# Patient Record
Sex: MALE | Race: WHITE | NOT HISPANIC OR LATINO
[De-identification: names, ages, dates, MRNs, and addresses within clinical notes are randomized per-mention and may not be internally consistent; named-entity substitution may affect disease eponyms.]

---

## 2017-02-15 ENCOUNTER — APPOINTMENT (OUTPATIENT)
Dept: INTERNAL MEDICINE | Facility: CLINIC | Age: 67
End: 2017-02-15
Payer: MEDICARE

## 2017-02-15 DIAGNOSIS — M67.431 GANGLION, RIGHT WRIST: ICD-10-CM

## 2017-02-15 DIAGNOSIS — Z82.3 FAMILY HISTORY OF STROKE: ICD-10-CM

## 2017-02-15 DIAGNOSIS — Z82.62 FAMILY HISTORY OF OSTEOPOROSIS: ICD-10-CM

## 2017-02-15 DIAGNOSIS — Z87.19 PERSONAL HISTORY OF OTHER DISEASES OF THE DIGESTIVE SYSTEM: ICD-10-CM

## 2017-02-15 DIAGNOSIS — M65.4 RADIAL STYLOID TENOSYNOVITIS [DE QUERVAIN]: ICD-10-CM

## 2017-02-15 PROCEDURE — 99214 OFFICE O/P EST MOD 30 MIN: CPT

## 2017-02-15 RX ORDER — METOPROLOL SUCCINATE 25 MG/1
25 TABLET, EXTENDED RELEASE ORAL
Qty: 1 | Refills: 0 | Status: DISCONTINUED | COMMUNITY
Start: 2016-09-19

## 2017-02-15 RX ORDER — ROSUVASTATIN CALCIUM 20 MG/1
20 TABLET, FILM COATED ORAL
Qty: 30 | Refills: 5 | Status: ACTIVE | COMMUNITY
Start: 2017-02-15 | End: 1900-01-01

## 2017-02-15 RX ORDER — CIPROFLOXACIN AND DEXAMETHASONE 3; 1 MG/ML; MG/ML
0.3-0.1 SUSPENSION/ DROPS AURICULAR (OTIC)
Qty: 1 | Refills: 0 | Status: DISCONTINUED | COMMUNITY
Start: 2016-08-26

## 2017-04-13 ENCOUNTER — OUTPATIENT (OUTPATIENT)
Dept: OUTPATIENT SERVICES | Facility: HOSPITAL | Age: 67
LOS: 1 days | Discharge: HOME | End: 2017-04-13
Payer: MEDICARE

## 2017-04-13 ENCOUNTER — APPOINTMENT (OUTPATIENT)
Dept: INTERNAL MEDICINE | Facility: HOSPITAL | Age: 67
End: 2017-04-13
Payer: MEDICARE

## 2017-04-13 DIAGNOSIS — K64.8 OTHER HEMORRHOIDS: ICD-10-CM

## 2017-04-13 DIAGNOSIS — E78.5 HYPERLIPIDEMIA, UNSPECIFIED: ICD-10-CM

## 2017-04-13 DIAGNOSIS — K57.30 DIVERTICULOSIS OF LARGE INTESTINE WITHOUT PERFORATION OR ABSCESS WITHOUT BLEEDING: ICD-10-CM

## 2017-04-13 DIAGNOSIS — Z12.11 ENCOUNTER FOR SCREENING FOR MALIGNANT NEOPLASM OF COLON: ICD-10-CM

## 2017-04-13 PROCEDURE — G0121: CPT

## 2017-04-13 PROCEDURE — 45378 DIAGNOSTIC COLONOSCOPY: CPT

## 2017-08-14 ENCOUNTER — OUTPATIENT (OUTPATIENT)
Dept: OUTPATIENT SERVICES | Facility: HOSPITAL | Age: 67
LOS: 1 days | End: 2017-08-14
Payer: MEDICARE

## 2017-08-14 ENCOUNTER — NON-APPOINTMENT (OUTPATIENT)
Age: 67
End: 2017-08-14

## 2017-08-14 ENCOUNTER — APPOINTMENT (OUTPATIENT)
Dept: RADIOLOGY | Facility: HOSPITAL | Age: 67
End: 2017-08-14
Payer: MEDICARE

## 2017-08-14 ENCOUNTER — APPOINTMENT (OUTPATIENT)
Dept: INTERNAL MEDICINE | Facility: CLINIC | Age: 67
End: 2017-08-14
Payer: MEDICARE

## 2017-08-14 VITALS
SYSTOLIC BLOOD PRESSURE: 126 MMHG | DIASTOLIC BLOOD PRESSURE: 68 MMHG | WEIGHT: 195 LBS | BODY MASS INDEX: 29.55 KG/M2 | HEART RATE: 68 BPM | HEIGHT: 68 IN | RESPIRATION RATE: 14 BRPM

## 2017-08-14 DIAGNOSIS — R05 COUGH: ICD-10-CM

## 2017-08-14 PROCEDURE — 90471 IMMUNIZATION ADMIN: CPT

## 2017-08-14 PROCEDURE — 93000 ELECTROCARDIOGRAM COMPLETE: CPT | Mod: 59

## 2017-08-14 PROCEDURE — 71046 X-RAY EXAM CHEST 2 VIEWS: CPT

## 2017-08-14 PROCEDURE — G0438: CPT

## 2017-08-14 PROCEDURE — 90715 TDAP VACCINE 7 YRS/> IM: CPT | Mod: GY

## 2017-08-14 PROCEDURE — 71020: CPT | Mod: 26

## 2017-08-14 RX ORDER — ROSUVASTATIN CALCIUM 40 MG/1
40 TABLET, FILM COATED ORAL DAILY
Qty: 90 | Refills: 1 | Status: DISCONTINUED | COMMUNITY
End: 2017-08-14

## 2017-08-14 RX ORDER — FLUTICASONE PROPIONATE AND SALMETEROL 50; 100 UG/1; UG/1
100-50 POWDER RESPIRATORY (INHALATION)
Qty: 1 | Refills: 0 | Status: DISCONTINUED | COMMUNITY
Start: 2017-02-15 | End: 2017-08-14

## 2017-08-14 RX ORDER — AZITHROMYCIN 250 MG/1
250 TABLET, FILM COATED ORAL
Qty: 6 | Refills: 1 | Status: DISCONTINUED | COMMUNITY
Start: 2017-02-15 | End: 2017-08-14

## 2017-08-14 RX ORDER — POLYETHYLENE GLYCOL-3350 AND ELECTROLYTES 236; 6.74; 5.86; 2.97; 22.74 G/274.31G; G/274.31G; G/274.31G; G/274.31G; G/274.31G
236 POWDER, FOR SOLUTION ORAL
Qty: 1 | Refills: 0 | Status: DISCONTINUED | COMMUNITY
Start: 2017-02-15 | End: 2017-08-14

## 2017-08-28 ENCOUNTER — LABORATORY RESULT (OUTPATIENT)
Age: 67
End: 2017-08-28

## 2017-09-01 LAB
25(OH)D3 SERPL-MCNC: 27.5 NG/ML
ALBUMIN SERPL ELPH-MCNC: 4.3 G/DL
ALP BLD-CCNC: 62 U/L
ALT SERPL-CCNC: 35 U/L
ANION GAP SERPL CALC-SCNC: 13 MMOL/L
APPEARANCE: CLEAR
AST SERPL-CCNC: 32 U/L
BASOPHILS # BLD AUTO: 0.05 K/UL
BASOPHILS NFR BLD AUTO: 0.8 %
BILIRUB SERPL-MCNC: 1.4 MG/DL
BILIRUBIN URINE: NEGATIVE
BLOOD URINE: NEGATIVE
BUN SERPL-MCNC: 19 MG/DL
CALCIUM SERPL-MCNC: 9.7 MG/DL
CHLORIDE SERPL-SCNC: 104 MMOL/L
CHOLEST SERPL-MCNC: 219 MG/DL
CHOLEST/HDLC SERPL: 3.8 RATIO
CO2 SERPL-SCNC: 25 MMOL/L
COLOR: YELLOW
CREAT SERPL-MCNC: 1.06 MG/DL
CRP SERPL HS-MCNC: 0.8 MG/L
EOSINOPHIL # BLD AUTO: 0.15 K/UL
EOSINOPHIL NFR BLD AUTO: 2.5 %
GLUCOSE BS SERPL-MCNC: 96 MG/DL
GLUCOSE QUALITATIVE U: NORMAL MG/DL
GLUCOSE SERPL-MCNC: 102 MG/DL
HBA1C MFR BLD HPLC: 5.9 %
HCT VFR BLD CALC: 50.4 %
HDLC SERPL-MCNC: 57 MG/DL
HGB BLD-MCNC: 16.8 G/DL
IMM GRANULOCYTES NFR BLD AUTO: 0 %
KETONES URINE: NEGATIVE
LDLC SERPL CALC-MCNC: 129 MG/DL
LEUKOCYTE ESTERASE URINE: NEGATIVE
LYMPHOCYTES # BLD AUTO: 1.32 K/UL
LYMPHOCYTES NFR BLD AUTO: 22.4 %
MAN DIFF?: NORMAL
MCHC RBC-ENTMCNC: 29.3 PG
MCHC RBC-ENTMCNC: 33.3 GM/DL
MCV RBC AUTO: 88 FL
MONOCYTES # BLD AUTO: 0.48 K/UL
MONOCYTES NFR BLD AUTO: 8.1 %
NEUTROPHILS # BLD AUTO: 3.89 K/UL
NEUTROPHILS NFR BLD AUTO: 66.2 %
NITRITE URINE: NEGATIVE
PH URINE: 6
PLATELET # BLD AUTO: 163 K/UL
POTASSIUM SERPL-SCNC: 4.7 MMOL/L
PROT SERPL-MCNC: 7.4 G/DL
PROTEIN URINE: 30 MG/DL
PSA FREE FLD-MCNC: 48.7
PSA FREE SERPL-MCNC: 0.19 NG/ML
PSA SERPL-MCNC: 0.39 NG/ML
RBC # BLD: 5.73 M/UL
RBC # FLD: 13.4 %
SODIUM SERPL-SCNC: 142 MMOL/L
SPECIFIC GRAVITY URINE: 1.03
T4 FREE SERPL-MCNC: 1.2 NG/DL
TRIGL SERPL-MCNC: 163 MG/DL
TSH SERPL-ACNC: 2.17 UIU/ML
UROBILINOGEN URINE: NORMAL MG/DL
VIT B12 SERPL-MCNC: 299 PG/ML
WBC # FLD AUTO: 5.89 K/UL

## 2017-10-17 ENCOUNTER — APPOINTMENT (OUTPATIENT)
Dept: INTERNAL MEDICINE | Facility: CLINIC | Age: 67
End: 2017-10-17
Payer: MEDICARE

## 2017-10-17 VITALS
WEIGHT: 196 LBS | BODY MASS INDEX: 29.7 KG/M2 | TEMPERATURE: 98.2 F | HEIGHT: 68 IN | SYSTOLIC BLOOD PRESSURE: 128 MMHG | RESPIRATION RATE: 14 BRPM | HEART RATE: 72 BPM | DIASTOLIC BLOOD PRESSURE: 72 MMHG

## 2017-10-17 PROCEDURE — 99214 OFFICE O/P EST MOD 30 MIN: CPT

## 2018-05-07 ENCOUNTER — TRANSCRIPTION ENCOUNTER (OUTPATIENT)
Age: 68
End: 2018-05-07

## 2018-07-02 ENCOUNTER — APPOINTMENT (OUTPATIENT)
Dept: INTERNAL MEDICINE | Facility: CLINIC | Age: 68
End: 2018-07-02

## 2018-07-17 ENCOUNTER — TRANSCRIPTION ENCOUNTER (OUTPATIENT)
Age: 68
End: 2018-07-17

## 2018-10-26 ENCOUNTER — APPOINTMENT (OUTPATIENT)
Dept: INTERNAL MEDICINE | Facility: CLINIC | Age: 68
End: 2018-10-26

## 2018-11-06 ENCOUNTER — APPOINTMENT (OUTPATIENT)
Dept: INTERNAL MEDICINE | Facility: CLINIC | Age: 68
End: 2018-11-06
Payer: MEDICARE

## 2018-11-06 ENCOUNTER — NON-APPOINTMENT (OUTPATIENT)
Age: 68
End: 2018-11-06

## 2018-11-06 VITALS — RESPIRATION RATE: 16 BRPM | HEART RATE: 74 BPM

## 2018-11-06 VITALS — HEIGHT: 68 IN | BODY MASS INDEX: 30.01 KG/M2 | WEIGHT: 198 LBS

## 2018-11-06 VITALS — TEMPERATURE: 98.6 F | SYSTOLIC BLOOD PRESSURE: 130 MMHG | DIASTOLIC BLOOD PRESSURE: 80 MMHG

## 2018-11-06 DIAGNOSIS — R00.2 PALPITATIONS: ICD-10-CM

## 2018-11-06 DIAGNOSIS — Z78.9 OTHER SPECIFIED HEALTH STATUS: ICD-10-CM

## 2018-11-06 PROCEDURE — G0439: CPT | Mod: 26

## 2018-11-06 PROCEDURE — 99213 OFFICE O/P EST LOW 20 MIN: CPT | Mod: 25

## 2018-11-06 PROCEDURE — 93000 ELECTROCARDIOGRAM COMPLETE: CPT

## 2018-11-06 NOTE — PHYSICAL EXAM
[No Acute Distress] : no acute distress [Well Nourished] : well nourished [Well Developed] : well developed [Well-Appearing] : well-appearing [Normal Voice/Communication] : normal voice/communication [Normal Sclera/Conjunctiva] : normal sclera/conjunctiva [PERRL] : pupils equal round and reactive to light [EOMI] : extraocular movements intact [Normal Outer Ear/Nose] : the outer ears and nose were normal in appearance [Normal Oropharynx] : the oropharynx was normal [Normal TMs] : both tympanic membranes were normal [Normal Nasal Mucosa] : the nasal mucosa was normal [No JVD] : no jugular venous distention [Supple] : supple [No Lymphadenopathy] : no lymphadenopathy [Thyroid Normal, No Nodules] : the thyroid was normal and there were no nodules present [No Respiratory Distress] : no respiratory distress  [Clear to Auscultation] : lungs were clear to auscultation bilaterally [No Accessory Muscle Use] : no accessory muscle use [Normal Percussion] : the chest was normal to percussion [Normal Rate] : normal rate  [Regular Rhythm] : with a regular rhythm [Normal S1, S2] : normal S1 and S2 [No Murmur] : no murmur heard [No Carotid Bruits] : no carotid bruits [No Abdominal Bruit] : a ~M bruit was not heard ~T in the abdomen [No Varicosities] : no varicosities [Pedal Pulses Present] : the pedal pulses are present [No Edema] : there was no peripheral edema [No Extremity Clubbing/Cyanosis] : no extremity clubbing/cyanosis [No Palpable Aorta] : no palpable aorta [Declined Breast Exam] : declined breast exam  [Soft] : abdomen soft [Non Tender] : non-tender [Non-distended] : non-distended [No Masses] : no abdominal mass palpated [No HSM] : no HSM [Normal Bowel Sounds] : normal bowel sounds [No Hernias] : no hernias [Declined Rectal Exam] : declined rectal exam [Normal Supraclavicular Nodes] : no supraclavicular lymphadenopathy [Normal Axillary Nodes] : no axillary lymphadenopathy [Normal Posterior Cervical Nodes] : no posterior cervical lymphadenopathy [Normal Femoral Nodes] : no femoral lymphadenopathy [Normal Anterior Cervical Nodes] : no anterior cervical lymphadenopathy [Normal Inguinal Nodes] : no inguinal lymphadenopathy [No CVA Tenderness] : no CVA  tenderness [No Spinal Tenderness] : no spinal tenderness [Grossly Normal Strength/Tone] : grossly normal strength/tone [No Rash] : no rash [No Skin Lesions] : no skin lesions [Normal Gait] : normal gait [Coordination Grossly Intact] : coordination grossly intact [No Focal Deficits] : no focal deficits [Deep Tendon Reflexes (DTR)] : deep tendon reflexes were 2+ and symmetric [Speech Grossly Normal] : speech grossly normal [Memory Grossly Normal] : memory grossly normal [Normal Affect] : the affect was normal [Alert and Oriented x3] : oriented to person, place, and time [Normal Mood] : the mood was normal [Normal Insight/Judgement] : insight and judgment were intact [Kyphosis] : no kyphosis [Scoliosis] : no scoliosis [Acne] : no acne [de-identified] : base bif toe on right swollen and tender  mild tenderness little toe

## 2018-11-06 NOTE — HEALTH RISK ASSESSMENT
[No falls in past year] : Patient reported no falls in the past year [0] : 2) Feeling down, depressed, or hopeless: Not at all (0) [[id number]] : 0

## 2018-11-06 NOTE — REVIEW OF SYSTEMS
[Fatigue] : fatigue [Nocturia] : nocturia [Joint Pain] : joint pain [Joint Swelling] : joint swelling [Insomnia] : insomnia [Anxiety] : anxiety [Fever] : no fever [Chills] : no chills [Hot Flashes] : no hot flashes [Night Sweats] : no night sweats [Recent Change In Weight] : ~T no recent weight change [Discharge] : no discharge [Pain] : no pain [Redness] : no redness [Dryness] : no dryness [Vision Problems] : no vision problems [Itching] : no itching [Earache] : no earache [Hearing Loss] : no hearing loss [Nosebleeds] : no nosebleeds [Postnasal Drip] : no postnasal drip [Nasal Discharge] : no nasal discharge [Sore Throat] : no sore throat [Hoarseness] : no hoarseness [Chest Pain] : no chest pain [Palpitations] : no palpitations [Claudication] : no  leg claudication [Lower Ext Edema] : no lower extremity edema [Orthopena] : no orthopnea [Paroysmal Nocturnal Dyspnea] : no paroysmal nocturnal dyspnea [Shortness Of Breath] : no shortness of breath [Wheezing] : no wheezing [Cough] : no cough [Dyspnea on Exertion] : not dyspnea on exertion [Abdominal Pain] : no abdominal pain [Nausea] : no nausea [Constipation] : no constipation [Diarrhea] : no diarrhea [Vomiting] : no vomiting [Heartburn] : no heartburn [Melena] : no melena [Dysuria] : no dysuria [Incontinence] : no incontinence [Hesitancy] : no hesitancy [Hematuria] : no hematuria [Frequency] : no frequency [Impotence] : no impotency [Poor Libido] : libido not poor [Joint Stiffness] : no joint stiffness [Muscle Pain] : no muscle pain [Muscle Weakness] : no muscle weakness [Back Pain] : no back pain [Itching] : no itching [Mole Changes] : no mole changes [Nail Changes] : no nail changes [Hair Changes] : no hair changes [Skin Rash] : no skin rash [Headache] : no headache [Dizziness] : no dizziness [Fainting] : no fainting [Confusion] : no confusion [Unsteady Walk] : no ataxia [Memory Loss] : no memory loss [Suicidal] : not suicidal [Depression] : no depression [Easy Bleeding] : no easy bleeding [Easy Bruising] : no easy bruising [Swollen Glands] : no swollen glands [FreeTextEntry9] : right ft base big toe ans little toe

## 2018-11-06 NOTE — ASSESSMENT
[FreeTextEntry1] : Physical exam shows a well-developed man in no acute distress blood pressure was 130/80 height 5 foot 8 weight 198 pounds BMI 30.11 heart rate is 74 respirations at 16 HEENT was unremarkable chest was clear cardiovascular exam was regular abdomen soft neuro nonfocal EKG showed a right bundle branch block which has been present prior slip was given for full blood tests tapering schedule of indomethacin was given for the gout that he has he will reported daily uric acid was included in his blood tests . He also has felt somewhat fatigued and a testosterone level will be drawn as well is under a lot of stress selling his house with ideas of relocating and retiring he was offered medication such as an antidepressant and refuses it at this time he is up-to-date with his ophthalmologist and dermatologist we will check the status of his last colonoscopy in contact refuses immunizations at this time

## 2018-11-06 NOTE — PHYSICAL EXAM
[No Acute Distress] : no acute distress [Well Nourished] : well nourished [Well Developed] : well developed [Well-Appearing] : well-appearing [Normal Voice/Communication] : normal voice/communication [Normal Sclera/Conjunctiva] : normal sclera/conjunctiva [PERRL] : pupils equal round and reactive to light [EOMI] : extraocular movements intact [Normal Outer Ear/Nose] : the outer ears and nose were normal in appearance [Normal Oropharynx] : the oropharynx was normal [Normal TMs] : both tympanic membranes were normal [Normal Nasal Mucosa] : the nasal mucosa was normal [No JVD] : no jugular venous distention [Supple] : supple [No Lymphadenopathy] : no lymphadenopathy [Thyroid Normal, No Nodules] : the thyroid was normal and there were no nodules present [No Respiratory Distress] : no respiratory distress  [Clear to Auscultation] : lungs were clear to auscultation bilaterally [No Accessory Muscle Use] : no accessory muscle use [Normal Percussion] : the chest was normal to percussion [Normal Rate] : normal rate  [Regular Rhythm] : with a regular rhythm [Normal S1, S2] : normal S1 and S2 [No Murmur] : no murmur heard [No Carotid Bruits] : no carotid bruits [No Abdominal Bruit] : a ~M bruit was not heard ~T in the abdomen [No Varicosities] : no varicosities [Pedal Pulses Present] : the pedal pulses are present [No Edema] : there was no peripheral edema [No Extremity Clubbing/Cyanosis] : no extremity clubbing/cyanosis [No Palpable Aorta] : no palpable aorta [Declined Breast Exam] : declined breast exam  [Soft] : abdomen soft [Non Tender] : non-tender [Non-distended] : non-distended [No Masses] : no abdominal mass palpated [No HSM] : no HSM [Normal Bowel Sounds] : normal bowel sounds [No Hernias] : no hernias [Declined Rectal Exam] : declined rectal exam [Normal Supraclavicular Nodes] : no supraclavicular lymphadenopathy [Normal Axillary Nodes] : no axillary lymphadenopathy [Normal Posterior Cervical Nodes] : no posterior cervical lymphadenopathy [Normal Femoral Nodes] : no femoral lymphadenopathy [Normal Anterior Cervical Nodes] : no anterior cervical lymphadenopathy [Normal Inguinal Nodes] : no inguinal lymphadenopathy [No CVA Tenderness] : no CVA  tenderness [No Spinal Tenderness] : no spinal tenderness [Grossly Normal Strength/Tone] : grossly normal strength/tone [No Rash] : no rash [No Skin Lesions] : no skin lesions [Normal Gait] : normal gait [Coordination Grossly Intact] : coordination grossly intact [No Focal Deficits] : no focal deficits [Deep Tendon Reflexes (DTR)] : deep tendon reflexes were 2+ and symmetric [Speech Grossly Normal] : speech grossly normal [Memory Grossly Normal] : memory grossly normal [Normal Affect] : the affect was normal [Alert and Oriented x3] : oriented to person, place, and time [Normal Mood] : the mood was normal [Normal Insight/Judgement] : insight and judgment were intact [Kyphosis] : no kyphosis [Scoliosis] : no scoliosis [Acne] : no acne [de-identified] : base bif toe on right swollen and tender  mild tenderness little toe

## 2018-11-06 NOTE — HISTORY OF PRESENT ILLNESS
[FreeTextEntry1] : Comes to the office for a comprehensive physical examination to review his medications and discuss his overall health recent complaints dealing with soreness at the base of his big toe of his right foot [de-identified] : 68-year-old man comes to the office after a hiatus of several years with a history of hyperlipidemia patient has been experiencing pain at the base of his right big toe over the past several weeks so her podiatrist who treated him for fungus patient unable to walk or touch the area secondary to pain he has a history of gout in the past also complaining of some mild discomfort in the right foot little toe he denies temperature chills sweats or myalgias . He has had no headache sinus congestion sore throat cough chest pain pleurisy shortness of breath exertional dyspnea lightheadedness dizziness vertigo or syncope denies abdominal pain dysuria hematuria nausea vomiting diarrhea constipation bright red blood per rectum or black stools his appetite has been good and his weight has been stable

## 2018-11-06 NOTE — HISTORY OF PRESENT ILLNESS
[FreeTextEntry1] : Comes to the office for a comprehensive physical examination to review his medications and discuss his overall health recent complaints dealing with soreness at the base of his big toe of his right foot [de-identified] : 68-year-old man comes to the office after a hiatus of several years with a history of hyperlipidemia patient has been experiencing pain at the base of his right big toe over the past several weeks so her podiatrist who treated him for fungus patient unable to walk or touch the area secondary to pain he has a history of gout in the past also complaining of some mild discomfort in the right foot little toe he denies temperature chills sweats or myalgias . He has had no headache sinus congestion sore throat cough chest pain pleurisy shortness of breath exertional dyspnea lightheadedness dizziness vertigo or syncope denies abdominal pain dysuria hematuria nausea vomiting diarrhea constipation bright red blood per rectum or black stools his appetite has been good and his weight has been stable

## 2018-11-06 NOTE — HEALTH RISK ASSESSMENT
[No falls in past year] : Patient reported no falls in the past year [0] : 2) Feeling down, depressed, or hopeless: Not at all (0) [[id number]] : 0

## 2018-12-11 ENCOUNTER — LABORATORY RESULT (OUTPATIENT)
Age: 68
End: 2018-12-11

## 2018-12-20 ENCOUNTER — FORM ENCOUNTER (OUTPATIENT)
Age: 68
End: 2018-12-20

## 2018-12-21 ENCOUNTER — APPOINTMENT (OUTPATIENT)
Dept: RADIOLOGY | Facility: HOSPITAL | Age: 68
End: 2018-12-21
Payer: MEDICARE

## 2018-12-21 ENCOUNTER — OUTPATIENT (OUTPATIENT)
Dept: OUTPATIENT SERVICES | Facility: HOSPITAL | Age: 68
LOS: 1 days | End: 2018-12-21
Payer: MEDICARE

## 2018-12-21 DIAGNOSIS — Z00.8 ENCOUNTER FOR OTHER GENERAL EXAMINATION: ICD-10-CM

## 2018-12-21 PROCEDURE — 71046 X-RAY EXAM CHEST 2 VIEWS: CPT

## 2018-12-21 PROCEDURE — 71046 X-RAY EXAM CHEST 2 VIEWS: CPT | Mod: 26

## 2019-01-09 LAB
25(OH)D3 SERPL-MCNC: 29.3 NG/ML
ALBUMIN SERPL ELPH-MCNC: 4.5 G/DL
ALP BLD-CCNC: 64 U/L
ALT SERPL-CCNC: 33 U/L
ANION GAP SERPL CALC-SCNC: 13 MMOL/L
APPEARANCE: CLEAR
AST SERPL-CCNC: 30 U/L
BASOPHILS # BLD AUTO: 0.09 K/UL
BASOPHILS NFR BLD AUTO: 1.5 %
BILIRUB SERPL-MCNC: 1 MG/DL
BILIRUBIN URINE: NEGATIVE
BLOOD URINE: NEGATIVE
BUN SERPL-MCNC: 22 MG/DL
CALCIUM SERPL-MCNC: 10.1 MG/DL
CHLORIDE SERPL-SCNC: 104 MMOL/L
CHOLEST SERPL-MCNC: 213 MG/DL
CHOLEST/HDLC SERPL: 3.4 RATIO
CO2 SERPL-SCNC: 26 MMOL/L
COLOR: YELLOW
CREAT SERPL-MCNC: 1.13 MG/DL
CRP SERPL HS-MCNC: 4.1 MG/L
EOSINOPHIL # BLD AUTO: 0.19 K/UL
EOSINOPHIL NFR BLD AUTO: 3.1 %
GLUCOSE BS SERPL-MCNC: 102 MG/DL
GLUCOSE QUALITATIVE U: NEGATIVE MG/DL
GLUCOSE SERPL-MCNC: 103 MG/DL
HBA1C MFR BLD HPLC: 5.9 %
HCT VFR BLD CALC: 50.7 %
HDLC SERPL-MCNC: 63 MG/DL
HGB BLD-MCNC: 16.6 G/DL
IMM GRANULOCYTES NFR BLD AUTO: 0.3 %
KETONES URINE: NEGATIVE
LDLC SERPL CALC-MCNC: 131 MG/DL
LEUKOCYTE ESTERASE URINE: NEGATIVE
LYMPHOCYTES # BLD AUTO: 1.6 K/UL
LYMPHOCYTES NFR BLD AUTO: 26.4 %
MAN DIFF?: NORMAL
MCHC RBC-ENTMCNC: 29 PG
MCHC RBC-ENTMCNC: 32.7 GM/DL
MCV RBC AUTO: 88.5 FL
MONOCYTES # BLD AUTO: 0.59 K/UL
MONOCYTES NFR BLD AUTO: 9.7 %
NEUTROPHILS # BLD AUTO: 3.58 K/UL
NEUTROPHILS NFR BLD AUTO: 59 %
NITRITE URINE: NEGATIVE
PH URINE: 5.5
PLATELET # BLD AUTO: 178 K/UL
POTASSIUM SERPL-SCNC: 5.4 MMOL/L
PROT SERPL-MCNC: 7.7 G/DL
PROTEIN URINE: ABNORMAL MG/DL
PSA FREE FLD-MCNC: 40 %
PSA FREE SERPL-MCNC: 0.19 NG/ML
PSA SERPL-MCNC: 0.48 NG/ML
RBC # BLD: 5.73 M/UL
RBC # FLD: 13.4 %
SODIUM SERPL-SCNC: 143 MMOL/L
SPECIFIC GRAVITY URINE: 1.02
T4 FREE SERPL-MCNC: 1.2 NG/DL
TESTOST BND SERPL-MCNC: 10.9 PG/ML
TESTOST SERPL-MCNC: 434.2 NG/DL
TRIGL SERPL-MCNC: 97 MG/DL
TSH SERPL-ACNC: 4.19 UIU/ML
URATE SERPL-MCNC: 7.2 MG/DL
UROBILINOGEN URINE: NEGATIVE MG/DL
VIT B12 SERPL-MCNC: 385 PG/ML
WBC # FLD AUTO: 6.07 K/UL

## 2019-06-20 ENCOUNTER — RX RENEWAL (OUTPATIENT)
Age: 69
End: 2019-06-20

## 2019-08-28 ENCOUNTER — INPATIENT (INPATIENT)
Facility: HOSPITAL | Age: 69
LOS: 1 days | Discharge: HOME | DRG: 310 | End: 2019-08-30
Attending: INTERNAL MEDICINE | Admitting: INTERNAL MEDICINE
Payer: MEDICARE

## 2019-08-28 VITALS
HEIGHT: 68 IN | OXYGEN SATURATION: 100 % | WEIGHT: 199.96 LBS | HEART RATE: 48 BPM | DIASTOLIC BLOOD PRESSURE: 76 MMHG | TEMPERATURE: 97 F | SYSTOLIC BLOOD PRESSURE: 153 MMHG | RESPIRATION RATE: 17 BRPM

## 2019-08-28 DIAGNOSIS — R00.1 BRADYCARDIA, UNSPECIFIED: ICD-10-CM

## 2019-08-28 LAB
ALBUMIN SERPL ELPH-MCNC: 3.5 G/DL — SIGNIFICANT CHANGE UP (ref 3.3–5)
ALP SERPL-CCNC: 63 U/L — SIGNIFICANT CHANGE UP (ref 40–120)
ALT FLD-CCNC: 42 U/L DA — SIGNIFICANT CHANGE UP (ref 10–45)
ANION GAP SERPL CALC-SCNC: 6 MMOL/L — SIGNIFICANT CHANGE UP (ref 5–17)
AST SERPL-CCNC: 38 U/L — SIGNIFICANT CHANGE UP (ref 10–40)
BILIRUB SERPL-MCNC: 0.8 MG/DL — SIGNIFICANT CHANGE UP (ref 0.2–1.2)
BUN SERPL-MCNC: 26 MG/DL — HIGH (ref 7–23)
CALCIUM SERPL-MCNC: 9.6 MG/DL — SIGNIFICANT CHANGE UP (ref 8.4–10.5)
CHLORIDE SERPL-SCNC: 105 MMOL/L — SIGNIFICANT CHANGE UP (ref 96–108)
CO2 SERPL-SCNC: 30 MMOL/L — SIGNIFICANT CHANGE UP (ref 22–31)
CREAT SERPL-MCNC: 1.18 MG/DL — SIGNIFICANT CHANGE UP (ref 0.5–1.3)
GLUCOSE SERPL-MCNC: 105 MG/DL — HIGH (ref 70–99)
HCT VFR BLD CALC: 52.2 % — HIGH (ref 39–50)
HGB BLD-MCNC: 17 G/DL — SIGNIFICANT CHANGE UP (ref 13–17)
MCHC RBC-ENTMCNC: 28.9 PG — SIGNIFICANT CHANGE UP (ref 27–34)
MCHC RBC-ENTMCNC: 32.6 GM/DL — SIGNIFICANT CHANGE UP (ref 32–36)
MCV RBC AUTO: 88.8 FL — SIGNIFICANT CHANGE UP (ref 80–100)
NRBC # BLD: 0 /100 WBCS — SIGNIFICANT CHANGE UP (ref 0–0)
PLATELET # BLD AUTO: 168 K/UL — SIGNIFICANT CHANGE UP (ref 150–400)
POTASSIUM SERPL-MCNC: 4.3 MMOL/L — SIGNIFICANT CHANGE UP (ref 3.5–5.3)
POTASSIUM SERPL-SCNC: 4.3 MMOL/L — SIGNIFICANT CHANGE UP (ref 3.5–5.3)
PROT SERPL-MCNC: 7.7 G/DL — SIGNIFICANT CHANGE UP (ref 6–8.3)
RBC # BLD: 5.88 M/UL — HIGH (ref 4.2–5.8)
RBC # FLD: 13 % — SIGNIFICANT CHANGE UP (ref 10.3–14.5)
SODIUM SERPL-SCNC: 141 MMOL/L — SIGNIFICANT CHANGE UP (ref 135–145)
TROPONIN I SERPL-MCNC: <.017 NG/ML — LOW (ref 0.02–0.06)
WBC # BLD: 7.19 K/UL — SIGNIFICANT CHANGE UP (ref 3.8–10.5)
WBC # FLD AUTO: 7.19 K/UL — SIGNIFICANT CHANGE UP (ref 3.8–10.5)

## 2019-08-28 PROCEDURE — 99223 1ST HOSP IP/OBS HIGH 75: CPT

## 2019-08-28 PROCEDURE — 93010 ELECTROCARDIOGRAM REPORT: CPT

## 2019-08-28 PROCEDURE — 99285 EMERGENCY DEPT VISIT HI MDM: CPT

## 2019-08-28 PROCEDURE — 71045 X-RAY EXAM CHEST 1 VIEW: CPT | Mod: 26

## 2019-08-28 RX ORDER — ATORVASTATIN CALCIUM 80 MG/1
40 TABLET, FILM COATED ORAL AT BEDTIME
Refills: 0 | Status: DISCONTINUED | OUTPATIENT
Start: 2019-08-28 | End: 2019-08-30

## 2019-08-28 RX ORDER — ONDANSETRON 8 MG/1
4 TABLET, FILM COATED ORAL EVERY 6 HOURS
Refills: 0 | Status: DISCONTINUED | OUTPATIENT
Start: 2019-08-28 | End: 2019-08-30

## 2019-08-28 RX ORDER — MECLIZINE HCL 12.5 MG
12.5 TABLET ORAL
Refills: 0 | Status: DISCONTINUED | OUTPATIENT
Start: 2019-08-28 | End: 2019-08-30

## 2019-08-28 RX ORDER — ASPIRIN/CALCIUM CARB/MAGNESIUM 324 MG
81 TABLET ORAL DAILY
Refills: 0 | Status: DISCONTINUED | OUTPATIENT
Start: 2019-08-29 | End: 2019-08-30

## 2019-08-28 RX ORDER — PANTOPRAZOLE SODIUM 20 MG/1
40 TABLET, DELAYED RELEASE ORAL
Refills: 0 | Status: DISCONTINUED | OUTPATIENT
Start: 2019-08-28 | End: 2019-08-30

## 2019-08-28 RX ORDER — ACETAMINOPHEN 500 MG
650 TABLET ORAL EVERY 6 HOURS
Refills: 0 | Status: DISCONTINUED | OUTPATIENT
Start: 2019-08-28 | End: 2019-08-30

## 2019-08-28 RX ORDER — SODIUM CHLORIDE 9 MG/ML
500 INJECTION INTRAMUSCULAR; INTRAVENOUS; SUBCUTANEOUS ONCE
Refills: 0 | Status: COMPLETED | OUTPATIENT
Start: 2019-08-28 | End: 2019-08-28

## 2019-08-28 RX ORDER — ATROPINE SULFATE 0.1 MG/ML
0.5 SYRINGE (ML) INJECTION ONCE
Refills: 0 | Status: COMPLETED | OUTPATIENT
Start: 2019-08-28 | End: 2019-08-28

## 2019-08-28 RX ADMIN — SODIUM CHLORIDE 500 MILLILITER(S): 9 INJECTION INTRAMUSCULAR; INTRAVENOUS; SUBCUTANEOUS at 20:53

## 2019-08-28 RX ADMIN — Medication 0.5 MILLIGRAM(S): at 20:47

## 2019-08-28 RX ADMIN — SODIUM CHLORIDE 500 MILLILITER(S): 9 INJECTION INTRAMUSCULAR; INTRAVENOUS; SUBCUTANEOUS at 21:53

## 2019-08-28 RX ADMIN — ATORVASTATIN CALCIUM 40 MILLIGRAM(S): 80 TABLET, FILM COATED ORAL at 23:52

## 2019-08-28 NOTE — ED ADULT NURSE NOTE - CHIEF COMPLAINT QUOTE
Patient BIB EMS, states he was at home sitting at his computer when he was overcome with nausea, weakness and diaphoresis. Patient noted to be bradycardic (40s), received 1/2 mg atropine and 4 mg Zofran en route.

## 2019-08-28 NOTE — H&P ADULT - NEUROLOGICAL DETAILS
alert and oriented x 3/responds to pain/responds to verbal commands/sensation intact/deep reflexes intact/cranial nerves intact

## 2019-08-28 NOTE — ED ADULT NURSE NOTE - NSIMPLEMENTINTERV_GEN_ALL_ED
Implemented All Fall Risk Interventions:  [place] to call system. Call bell, personal items and telephone within reach. Instruct patient to call for assistance. Room bathroom lighting operational. Non-slip footwear when patient is off stretcher. Physically safe environment: no spills, clutter or unnecessary equipment. Stretcher in lowest position, wheels locked, appropriate side rails in place. Provide visual cue, wrist band, yellow gown, etc. Monitor gait and stability. Monitor for mental status changes and reorient to person, place, and time. Review medications for side effects contributing to fall risk. Reinforce activity limits and safety measures with patient and family.

## 2019-08-28 NOTE — ED PROVIDER NOTE - ATTENDING CONTRIBUTION TO CARE
pt c/o acute onset dizziness, lightheaded,, some room spinning,  severe, felt like fainting about 1830 tonight while working at computer, assoc c sweats and nausea. no chest pain, neck/jaw/arm pain.  pt noted few seconds of chest tightness 2 hrs before that.  per EMS , pt HR in 40s with SBP 160s, was given atropine 0.5 mg with no effect on sxs or HR.   pt had similar episode sept 2016, but it was related to consuming edible THC.  pt states he is still feelingmildly dizzy but better   PMH: RBBB, hypercholesterolemia    exam:   General: well appearing, NAD.   HEENT: eyes perrl, nose normal, OP no erythema/exudate/swelling.   cor: bradycardic, reg rhythm, s1s2, 2+rad pulses.   lungs: ctabl, no resp distress.   abd: soft, ntnd.   neuro: a&ox3, cn2-12 intact, MARTEL, 5/5 strength c nl sensation all extremities, nl coordination.   MSK: no extremity swelling.  Skin: normal, no rash      AP: dizziness, sweats, with bradycardia.  possible symptomatic bradycardia, acs, vertigo.  check labs, trops.  give additional trial of atropine . admit

## 2019-08-28 NOTE — H&P ADULT - ASSESSMENT
69 year old male, with HLD, presents with symptomatic bradycardia - +dizziness, nausea, chest tightness, near syncope.  ?arrythmia R/o ACS  ?Hypertension    Admit to telemetry  Trend trops, echo, carotid dopplers  Cont Statin, start ASA  Cont to monitor BP  Cardio eval - Dr Caro  Antiemetic prn, Meclizine prn  GI prophylaxis    IMPROVE VTE Individual Risk Assessment  RISK                                                                Points  [  ] Previous VTE                                                  3  [  ] Thrombophilia                                               2  [  ] Lower limb paralysis                                      2        (unable to hold up >15 seconds)    [  ] Current Cancer                                              2         (within 6 months)  [  ] Immobilization > 24 hrs                                1  [  ] ICU/CCU stay > 24 hours                              1  [x  ] Age > 60                                                      1  IMPROVE VTE Score __1__  IMPROVE Score 0-1: Low Risk, No VTE prophylaxis required for most patients, encourage ambulation.   IMPROVE Score 2-3: At risk, pharmacologic VTE prophylaxis is indicated for most patients (in the absence of a contraindication)  IMPROVE Score > or = 4: High Risk, pharmacologic VTE prophylaxis is indicated for most patients (in the absence of a contraindication) 69 year old male, with HLD, presents with symptomatic bradycardia - +dizziness, nausea, chest tightness, near syncope.  ?asovagal ?arrythmia R/o ACS    Admit to telemetry  Trend trops, echo, carotid dopplers  Cont Statin, start ASA  Cont to monitor BP  Cardio eval - Dr Caro  Antiemetic prn, Meclizine prn  GI prophylaxis    IMPROVE VTE Individual Risk Assessment  RISK                                                                Points  [  ] Previous VTE                                                  3  [  ] Thrombophilia                                               2  [  ] Lower limb paralysis                                      2        (unable to hold up >15 seconds)    [  ] Current Cancer                                              2         (within 6 months)  [  ] Immobilization > 24 hrs                                1  [  ] ICU/CCU stay > 24 hours                              1  [x  ] Age > 60                                                      1  IMPROVE VTE Score __1__  IMPROVE Score 0-1: Low Risk, No VTE prophylaxis required for most patients, encourage ambulation.   IMPROVE Score 2-3: At risk, pharmacologic VTE prophylaxis is indicated for most patients (in the absence of a contraindication)  IMPROVE Score > or = 4: High Risk, pharmacologic VTE prophylaxis is indicated for most patients (in the absence of a contraindication)

## 2019-08-28 NOTE — H&P ADULT - GASTROINTESTINAL DETAILS
soft/nontender/no distention/no masses palpable/bowel sounds normal/no bruit/no rebound tenderness/no guarding

## 2019-08-28 NOTE — ED PROVIDER NOTE - OBJECTIVE STATEMENT
69 year old male, PMhx of HLD; BIBEMS for dizziness, sweating, n/v. Patient states he was at rest and had sudden onset of diaphoresis, dizziness, nausea and vomiting. When EMS arrived pt was found to be bradycardic in the 40s, pale and diaphoretic. EMS gave 0.5mg atropine, 4mg zofran, ASA 325mg PTA. Patient notes some improvement but still does not feel well. Negative stress test last done three years ago. Denies fevers, chills, diarrhea, chest pain, shortness of breath or other complaints.

## 2019-08-28 NOTE — ED ADULT NURSE NOTE - OBJECTIVE STATEMENT
pt BIB EMS for dizziness, sweating, n/v. Patient states he was at rest on the computer and had sudden onset of diaphoresis, dizziness, nausea and vomiting. When EMS arrived pt was found to be bradycardic in the 40s, pale and diaphoretic. pt state she also had some chest tightness lasting a few seconds about 2 hours prior to this onset of dizziness/sweating. EMS gave 0.5mg atropine, 4mg zofran, ASA 325mg PTA. Pt notes some improvement but still does not feel well. pt states he has a PMH of right bundle branch block., Denies fevers, chills, diarrhea, chest pain, shortness of breath or other complaints.

## 2019-08-28 NOTE — H&P ADULT - NSHPPHYSICALEXAM_GEN_ALL_CORE
Vital Signs (24 Hrs):  T(C): 36.3 (08-28-19 @ 20:01), Max: 36.3 (08-28-19 @ 20:01)  HR: 65 (08-28-19 @ 21:10) (48 - 73)  BP: 143/80 (08-28-19 @ 21:10) (140/81 - 164/75)  RR: 16 (08-28-19 @ 21:10) (16 - 20)  SpO2: 98% (08-28-19 @ 21:10) (95% - 100%)  Daily Height in cm: 172.72 (28 Aug 2019 20:01)

## 2019-08-28 NOTE — ED PROVIDER NOTE - ATTESTATION, MLM
Never smoker
I have reviewed and confirmed nurses' notes for patient's medications, allergies, medical history, and surgical history.

## 2019-08-28 NOTE — ED PROVIDER NOTE - CLINICAL SUMMARY MEDICAL DECISION MAKING FREE TEXT BOX
70 yo M pw symptomatic bradycardia- will obtain ekg r/o acs/HB- labs, cxr, consider additional atropine if remains symptomatic 68 yo M pw symptomatic bradycardia- will obtain ekg r/o acs/HB- labs, cxr, consider additional atropine if remains symptomatic    update Dr Moscoso: pt HR low 60s after atropine in ED. still with slight dizziness, but otherwise well, better. trops, /labs normal. admit to tele

## 2019-08-28 NOTE — H&P ADULT - HISTORY OF PRESENT ILLNESS
69 year old male, PMhx of HLD; BIBEMS for dizziness, sweating, n/v. Patient states he was at rest and had sudden onset of diaphoresis, dizziness, nausea and vomiting. When EMS arrived pt was found to be bradycardic in the 40s, pale and diaphoretic. EMS gave 0.5mg atropine, 4mg zofran, ASA 325mg PTA. Patient notes some improvement but still does not feel well. Negative stress test last done three years ago. Denies fevers, chills, diarrhea, chest pain, shortness of breath or other complaints. 69 year old male, PMhx of HLD; BIBEMS for dizziness, sweating, nausea, vomiting. Patient states he was at rest and had sudden onset of diaphoresis, dizziness, nausea and vomiting. When EMS arrived pt was found to be bradycardic in the 40s, pale and diaphoretic. EMS gave 0.5mg atropine, 4mg zofran, ASA 325mg PTA. Patient notes some improvement but still does not feel well. Negative stress test last done three years ago. Denies fevers, chills, diarrhea, chest pain, shortness of breath or other complaints. 69 year old male, PMhx of HLD; BIBEMS for dizziness, sweating, nausea. Patient states he was sitting, using the computer and all of a sudden had stated symptoms.  Denies chest pain, but did have mild chest tightness, nausea and felt dizzy.  He was not feeling well, lasted for minutes, he felt he was about to pass out, so called EMS. When EMS arrived pt was found to be bradycardic in the 40s, pale and diaphoretic. EMS gave 0.5mg atropine, 4mg zofran, ASA 325mg.  Patient states he only felt sl relief but was still not totally well, still feeling dizzy and nauseous especially when he moves.  Denies cough, dyspnea, fever, chills, abd pain.  Pt states he last ate a [employer] sandwich at around 5 PM.    Pt states he's usually active, plays tennis 3 x a week and does a lot of yard work.  He had a stress test about 3 years ago w/c he thinks was neg.

## 2019-08-28 NOTE — ED ADULT TRIAGE NOTE - CHIEF COMPLAINT QUOTE
Patient BIB EMS, states he was at home sitting at his computer when he was overcome with weakness and diaphoresis. Patient noted to be bradycardic (40s), received 1/2 mg atropine and 4 mg Zofran en route. Patient BIB EMS, states he was at home sitting at his computer when he was overcome with nausea, weakness and diaphoresis. Patient noted to be bradycardic (40s), received 1/2 mg atropine and 4 mg Zofran en route.

## 2019-08-29 ENCOUNTER — TRANSCRIPTION ENCOUNTER (OUTPATIENT)
Age: 69
End: 2019-08-29

## 2019-08-29 DIAGNOSIS — R42 DIZZINESS AND GIDDINESS: ICD-10-CM

## 2019-08-29 LAB
ANION GAP SERPL CALC-SCNC: 2 MMOL/L — LOW (ref 5–17)
BUN SERPL-MCNC: 24 MG/DL — HIGH (ref 7–23)
CALCIUM SERPL-MCNC: 8.6 MG/DL — SIGNIFICANT CHANGE UP (ref 8.4–10.5)
CHLORIDE SERPL-SCNC: 108 MMOL/L — SIGNIFICANT CHANGE UP (ref 96–108)
CO2 SERPL-SCNC: 29 MMOL/L — SIGNIFICANT CHANGE UP (ref 22–31)
CREAT SERPL-MCNC: 1.1 MG/DL — SIGNIFICANT CHANGE UP (ref 0.5–1.3)
GLUCOSE SERPL-MCNC: 109 MG/DL — HIGH (ref 70–99)
HCV AB S/CO SERPL IA: 0.16 S/CO — SIGNIFICANT CHANGE UP (ref 0–0.99)
HCV AB SERPL-IMP: SIGNIFICANT CHANGE UP
MAGNESIUM SERPL-MCNC: 2.1 MG/DL — SIGNIFICANT CHANGE UP (ref 1.6–2.6)
POTASSIUM SERPL-MCNC: 4.2 MMOL/L — SIGNIFICANT CHANGE UP (ref 3.5–5.3)
POTASSIUM SERPL-SCNC: 4.2 MMOL/L — SIGNIFICANT CHANGE UP (ref 3.5–5.3)
SODIUM SERPL-SCNC: 139 MMOL/L — SIGNIFICANT CHANGE UP (ref 135–145)
TROPONIN I SERPL-MCNC: 0.02 NG/ML — SIGNIFICANT CHANGE UP (ref 0.02–0.06)
TROPONIN I SERPL-MCNC: 0.02 NG/ML — SIGNIFICANT CHANGE UP (ref 0.02–0.06)
TSH SERPL-MCNC: 2.72 UIU/ML — SIGNIFICANT CHANGE UP (ref 0.27–4.2)
VIT B12 SERPL-MCNC: 449 PG/ML — SIGNIFICANT CHANGE UP (ref 232–1245)

## 2019-08-29 PROCEDURE — 93306 TTE W/DOPPLER COMPLETE: CPT | Mod: 26

## 2019-08-29 PROCEDURE — 93880 EXTRACRANIAL BILAT STUDY: CPT | Mod: 26

## 2019-08-29 PROCEDURE — 93018 CV STRESS TEST I&R ONLY: CPT

## 2019-08-29 PROCEDURE — 70450 CT HEAD/BRAIN W/O DYE: CPT | Mod: 26

## 2019-08-29 PROCEDURE — 99235 HOSP IP/OBS SAME DATE MOD 70: CPT

## 2019-08-29 PROCEDURE — 99223 1ST HOSP IP/OBS HIGH 75: CPT | Mod: 25

## 2019-08-29 PROCEDURE — 93016 CV STRESS TEST SUPVJ ONLY: CPT

## 2019-08-29 RX ORDER — SENNA PLUS 8.6 MG/1
2 TABLET ORAL AT BEDTIME
Refills: 0 | Status: DISCONTINUED | OUTPATIENT
Start: 2019-08-29 | End: 2019-08-30

## 2019-08-29 RX ORDER — DOCUSATE SODIUM 100 MG
100 CAPSULE ORAL
Refills: 0 | Status: DISCONTINUED | OUTPATIENT
Start: 2019-08-29 | End: 2019-08-30

## 2019-08-29 RX ADMIN — SENNA PLUS 2 TABLET(S): 8.6 TABLET ORAL at 21:40

## 2019-08-29 RX ADMIN — PANTOPRAZOLE SODIUM 40 MILLIGRAM(S): 20 TABLET, DELAYED RELEASE ORAL at 06:05

## 2019-08-29 RX ADMIN — ATORVASTATIN CALCIUM 40 MILLIGRAM(S): 80 TABLET, FILM COATED ORAL at 21:40

## 2019-08-29 RX ADMIN — Medication 81 MILLIGRAM(S): at 11:31

## 2019-08-29 RX ADMIN — Medication 100 MILLIGRAM(S): at 21:40

## 2019-08-29 NOTE — DISCHARGE NOTE PROVIDER - CARE PROVIDER_API CALL
Enrique Anne)  Cardiovascular Disease; Endovascular Medicine; Interventional Cardiology; Vascular Medicine  [address] [place] [address]  Phone: [phone]  Fax: [phone]  Follow Up Time:     Regine Douglas)  Cardiovascular Disease; Interventional Cardiology  [address] [place] [address]  Phone: [phone]  Fax: [phone]  Follow Up Time:

## 2019-08-29 NOTE — CHART NOTE - NSCHARTNOTEFT_GEN_A_CORE
68Y/O M with PMH of HLD, presented with dizziness, nausea, diaphoresis, found to have bradycardia with possible suspected ACS.  EKG confirms bradycardia with RBBB. Troponins trend from 0.017 to 0.019. TTE reveals LVEF 64% with grade II diastolic dysfunction, borderline pulmonary HTN. CXR neg. plan for continue to trend Trops. c/w ASA & statins. cardiology and Neurology consult following.     Dispo: 24-48 hrs discharge pending neurology consult.     chart reviewed and and discuss with Dr. Muñiz 68Y/O M with PMH of HLD, presented with dizziness, nausea, diaphoresis, found to have bradycardia with possible suspected ACS.  EKG confirms bradycardia with RBBB. Troponins trend from 0.017 to 0.019. TTE reveals LVEF 64% with grade II diastolic dysfunction, borderline pulmonary HTN. CXR neg. plan for continue to trend Trops. c/w ASA & statins. cardiology and Neurology consult following.     Dispo: 24-48 hrs discharge pending neurology consult and f/u on duplex carotids arteries    chart reviewed and and discuss with Dr. Muñiz

## 2019-08-29 NOTE — DISCHARGE NOTE PROVIDER - CARE PROVIDERS DIRECT ADDRESSES
[email or web address]

## 2019-08-29 NOTE — DISCHARGE NOTE PROVIDER - NSDCFUADDINST_GEN_ALL_CORE_FT
you will be transferred to [facility] in [place] for cardiac catherization. Dr. Douglas and ROSMERY Linder will continue to f/u with you at [facility].

## 2019-08-29 NOTE — CONSULT NOTE ADULT - SUBJECTIVE AND OBJECTIVE BOX
Patient is a 69 year old man admitted 8/28/19 with dizziness and nausea. He states yesterday afternoon he was sitting at a computer and suddenly felt dizzy as if everything was spinning around him. He also noted a mild HA. He denies other associated neurological complaints. He noted nausea. He denies fever or trauma.  He called an ambulance and was brought to the ER. In the hospital he was given meclizine and zofran, He denies any dizziness today. He denies HA or other neurological complaints.    PMH: HLD          Three year history of episodes of a bright Pala forming in the middle of his visual field which enlarges and then disappears. He states has been increasing in frequency for the past 6 months to twice per week. Not associated with HA.    SH: No allergies. No smoking history. Occasional ETOH use. Occasional marijuana use.    [facility]: Unknown    Exam: Awake, alert, appropriate           Pupils 3mm reactive, EOM intact, no nystagmus, VFF           CNN II-XII intact            Motor tone and strength normal            Gait-normal            FTN intact            Sensation intact to pinprick and light touch            Position sense intact                          17.0   7.19  )-----------( 168      ( 28 Aug 2019 20:05 )             52.2     08-29    139  |  108  |  24<H>  ----------------------------<  109<H>  4.2   |  29  |  1.10  Thyroid Stimulating Hormone, Serum: 2.72 uIU/mL (08.29.19 @ 05:30)      Ca    8.6      29 Aug 2019 05:30  Mg     2.1     08-29    TPro  7.7  /  Alb  3.5  /  TBili  0.8  /  DBili  x   /  AST  38  /  ALT  42  /  AlkPhos  63  08-28

## 2019-08-29 NOTE — DISCHARGE NOTE PROVIDER - NSDCFUSCHEDAPPT_GEN_ALL_CORE_FT
DOMINICK BENITO ; 09/03/2019 ; [facility] PreAdmits DOMINICK BENITO ; 09/03/2019 ; [facility] PreAdmits

## 2019-08-29 NOTE — CONSULT NOTE ADULT - SUBJECTIVE AND OBJECTIVE BOX
Chief Complaint:   69 year old male, PMhx of HLD; BIBEMS for dizziness, sweating, nausea. Patient states he was sitting, using the computer and all of a sudden had stated symptoms.  Denies chest pain, but did have mild chest tightness, nausea and felt dizzy.  He was not feeling well, lasted for minutes, he felt he was about to pass out, so called EMS. When EMS arrived pt was found to be bradycardic in the 40s, pale and diaphoretic. EMS gave 0.5mg atropine, 4mg zofran, ASA 325mg.  Patient states he only felt sl relief but was still not totally well, still feeling dizzy and nauseous especially when he moves.  Denies cough, dyspnea, fever, chills, abd pain.  Pt states he last ate a PBJ sandwich at around 5 PM. Pt states he's usually active, plays tennis 3 x a week and does a lot of yard work.  He had a stress test about 3 years ago w/c he thinks was neg. had 2 rum and cokes on sunday about 5 days ago.     HPI:    PMH:   Right bundle branch block  HLD (hyperlipidemia)    PSH:   No significant past surgical history    Family History:  FAMILY HISTORY:  No pertinent family history in first degree relatives      Social History:  Smoking: non  Alcohol: occasional  Drugs:    Allergies:  No Known Allergies      Medications:  acetaminophen   Tablet .. 650 milliGRAM(s) Oral every 6 hours PRN  aluminum hydroxide/magnesium hydroxide/simethicone Suspension 30 milliLiter(s) Oral every 6 hours PRN  aspirin enteric coated 81 milliGRAM(s) Oral daily  atorvastatin 40 milliGRAM(s) Oral at bedtime  meclizine 12.5 milliGRAM(s) Oral four times a day PRN  ondansetron Injectable 4 milliGRAM(s) IV Push every 6 hours PRN  pantoprazole    Tablet 40 milliGRAM(s) Oral before breakfast      REVIEW OF SYSTEMS:  CONSTITUTIONAL: No fever, weight loss, or fatigue  EYES: No eye pain, visual disturbances, or discharge  ENMT:  No difficulty hearing, tinnitus, vertigo; No sinus or throat pain  NECK: No pain or stiffness  BREASTS: No pain, masses, or nipple discharge  RESPIRATORY: No cough, wheezing, chills or hemoptysis; No shortness of breath  CARDIOVASCULAR: No chest pain, palpitations, dizziness, or leg swelling  GASTROINTESTINAL: No abdominal or epigastric pain. No nausea, vomiting, or hematemesis; No diarrhea or constipation. No melena or hematochezia.  GENITOURINARY: No dysuria, frequency, hematuria, or incontinence  NEUROLOGICAL: No headaches, memory loss, loss of strength, numbness, or tremors  SKIN: No itching, burning, rashes, or lesions   LYMPH NODES: No enlarged glands  ENDOCRINE: No heat or cold intolerance; No hair loss  MUSCULOSKELETAL: No joint pain or swelling; No muscle, back, or extremity pain  PSYCHIATRIC: No depression, anxiety, mood swings, or difficulty sleeping  HEME/LYMPH: No easy bruising, or bleeding gums  ALLERY AND IMMUNOLOGIC: No hives or eczema    Physical Exam:  T(C): 36.6 (08-29-19 @ 05:47), Max: 36.6 (08-28-19 @ 22:25)  HR: 50 (08-29-19 @ 05:47) (48 - 73)  BP: 130/68 (08-29-19 @ 05:47) (127/68 - 164/75)  RR: 16 (08-29-19 @ 05:47) (14 - 20)  SpO2: 98% (08-29-19 @ 05:47) (95% - 100%)  Wt(kg): --    GENERAL: NAD, alopecia  HEAD:  Atraumatic, Normocephalic  EYES: EOMI, conjunctiva and sclera clear  ENT: Moist mucous membranes,  NECK: Supple, No JVD, no bruits  CHEST/LUNG: Clear to percussion bilaterally; No rales, rhonchi, wheezing, or rubs  HEART: Regular rate and rhythm; No murmurs, rubs, or gallops PMI non displaced.  ABDOMEN: Soft, Nontender, Nondistended; Bowel sounds present  EXTREMITIES:  2+ Peripheral Pulses, No clubbing, cyanosis, or edema  SKIN: No rashes or lesions  NERVOUS SYSTEM:  Cranial Nerves II-XII intact     Cardiovascular Diagnostic Testing:  ECG:    < from: 12 Lead ECG (08.28.19 @ 20:10) >  Ventricular Rate 52 BPM    Atrial Rate 52 BPM    P-R Interval 190 ms    QRS Duration 146 ms    Q-T Interval 470 ms    QTC Calculation(Bezet) 437 ms    P Axis 58 degrees    R Axis 97 degrees    T Axis -23 degrees    Diagnosis Line Sinus bradycardia  Right bundle branch block  T wave abnormality, consider inferior ischemia  Abnormal ECG  When compared with ECG of 18-SEP-2016 05:36,  No significant change was found    Confirmed by CHAPIS DURAN MD ([id number]) on 8/29/2019 8:26:17 AM    < end of copied text >      ECHO:    Labs:                        17.0   7.19  )-----------( 168      ( 28 Aug 2019 20:05 )             52.2     08-29    139  |  108  |  24<H>  ----------------------------<  109<H>  4.2   |  29  |  1.10    Ca    8.6      29 Aug 2019 05:30  Mg     2.1     08-29    TPro  7.7  /  Alb  3.5  /  TBili  0.8  /  DBili  x   /  AST  38  /  ALT  42  /  AlkPhos  63  08-28      CARDIAC MARKERS ( 29 Aug 2019 05:30 )  .019 ng/mL / x     / x     / x     / x      CARDIAC MARKERS ( 28 Aug 2019 23:40 )  .018 ng/mL / x     / x     / x     / x      CARDIAC MARKERS ( 28 Aug 2019 20:05 )  <.017 ng/mL / x     / x     / x     / x      Thyroid Stimulating Hormone, Serum: 2.72 uIU/mL (08-29 @ 05:30)      Imaging:    tel  sb

## 2019-08-29 NOTE — PROGRESS NOTE ADULT - SUBJECTIVE AND OBJECTIVE BOX
Patient is a 69y old  Male who presents with a chief complaint of nausea, dizziness, near syncope (28 Aug 2019 21:22)      INTERVAL HPI/OVERNIGHT EVENTS:  Currently without complaints      REVIEW OF SYSTEMS:  CONSTITUTIONAL: No fever, weight loss, or fatigue  EYES: No eye pain, visual disturbances, or discharge  ENMT:  No difficulty hearing, tinnitus, vertigo; No sinus or throat pain  NECK: No pain or stiffness  BREASTS: No pain, masses, or nipple discharge  RESPIRATORY: No cough, wheezing, chills or hemoptysis; No shortness of breath  CARDIOVASCULAR: No chest pain, palpitations, dizziness, or leg swelling  GASTROINTESTINAL: No abdominal or epigastric pain. No nausea, vomiting, or hematemesis; No diarrhea or constipation. No melena or hematochezia.  GENITOURINARY: No dysuria, frequency, hematuria, or incontinence  NEUROLOGICAL: No headaches, memory loss, loss of strength, numbness, or tremors  SKIN: No itching, burning, rashes, or lesions   LYMPH NODES: No enlarged glands  ENDOCRINE: No heat or cold intolerance; No hair loss  MUSCULOSKELETAL: No joint pain or swelling; No muscle, back, or extremity pain  PSYCHIATRIC: No depression, anxiety, mood swings, or difficulty sleeping  HEME/LYMPH: No easy bruising, or bleeding gums  ALLERY AND IMMUNOLOGIC: No hives or eczema  FAMILY HISTORY:  No pertinent family history in first degree relatives    T(C): 36.6 (08-29-19 @ 05:47), Max: 36.6 (08-28-19 @ 22:25)  HR: 50 (08-29-19 @ 05:47) (48 - 73)  BP: 130/68 (08-29-19 @ 05:47) (127/68 - 164/75)  RR: 16 (08-29-19 @ 05:47) (14 - 20)  SpO2: 98% (08-29-19 @ 05:47) (95% - 100%)  Wt(kg): --Vital Signs Last 24 Hrs  T(C): 36.6 (29 Aug 2019 05:47), Max: 36.6 (28 Aug 2019 22:25)  T(F): 97.9 (29 Aug 2019 05:47), Max: 97.9 (28 Aug 2019 22:25)  HR: 50 (29 Aug 2019 05:47) (48 - 73)  BP: 130/68 (29 Aug 2019 05:47) (127/68 - 164/75)  BP(mean): 89 (28 Aug 2019 22:25) (89 - 99)  RR: 16 (29 Aug 2019 05:47) (14 - 20)  SpO2: 98% (29 Aug 2019 05:47) (95% - 100%)    T(F): 97.9 (08-29-19 @ 05:47), Max: 97.9 (08-28-19 @ 22:25)  HR: 50 (08-29-19 @ 05:47) (48 - 73)  BP: 130/68 (08-29-19 @ 05:47) (127/68 - 164/75)  RR: 16 (08-29-19 @ 05:47) (14 - 20)  SpO2: 98% (08-29-19 @ 05:47) (95% - 100%)    PHYSICAL EXAM:  GENERAL: NAD, well-groomed, well-developed  HEAD:  Atraumatic, Normocephalic  EYES: EOMI, PERRLA, conjunctiva and sclera clear  ENMT: No tonsillar erythema, exudates, or enlargement; Moist mucous membranes, Good dentition, No lesions  NECK: Supple, No JVD, Normal thyroid  NERVOUS SYSTEM:  Alert & Oriented X3, Good concentration; Motor Strength 5/5 B/L upper and lower extremities; DTRs 2+ intact and symmetric  CHEST/LUNG: Clear to percussion bilaterally; No rales, rhonchi, wheezing, or rubs  HEART: Regular rate and rhythm; No murmurs, rubs, or gallops  ABDOMEN: Soft, Nontender, Nondistended; Bowel sounds present  EXTREMITIES:  2+ Peripheral Pulses, No clubbing, cyanosis, or edema  LYMPH: No lymphadenopathy noted  SKIN: No rashes or lesions    Consultant(s) Notes Reviewed:  [x ] YES  [ ] NO  Care Discussed with Consultants/Other Providers [ x] YES  [ ] NO    LABS:  08-29    139  |  108  |  24<H>  ----------------------------<  109<H>  4.2   |  29  |  1.10    Ca    8.6      29 Aug 2019 05:30  Mg     2.1     08-29    TPro  7.7  /  Alb  3.5  /  TBili  0.8  /  DBili  x   /  AST  38  /  ALT  42  /  AlkPhos  63  08-28                          17.0   7.19  )-----------( 168      ( 28 Aug 2019 20:05 )             52.2           LIVER FUNCTIONS - ( 28 Aug 2019 20:05 )  Alb: 3.5 g/dL / Pro: 7.7 g/dL / ALK PHOS: 63 U/L / ALT: 42 U/L DA / AST: 38 U/L / GGT: x           CARDIAC MARKERS ( 29 Aug 2019 05:30 )  .019 ng/mL / x     / x     / x     / x      CARDIAC MARKERS ( 28 Aug 2019 23:40 )  .018 ng/mL / x     / x     / x     / x      CARDIAC MARKERS ( 28 Aug 2019 20:05 )  <.017 ng/mL / x     / x     / x     / x                       17.0   7.19  )-----------( 168      ( 08-28 @ 20:05 )             52.2               RADIOLOGY & ADDITIONAL TESTS:    Imaging Personally Reviewed:  [ ] YES  [ ] NO  acetaminophen   Tablet .. 650 milliGRAM(s) Oral every 6 hours PRN  aluminum hydroxide/magnesium hydroxide/simethicone Suspension 30 milliLiter(s) Oral every 6 hours PRN  aspirin enteric coated 81 milliGRAM(s) Oral daily  atorvastatin 40 milliGRAM(s) Oral at bedtime  meclizine 12.5 milliGRAM(s) Oral four times a day PRN  ondansetron Injectable 4 milliGRAM(s) IV Push every 6 hours PRN  pantoprazole    Tablet 40 milliGRAM(s) Oral before breakfast      HEALTH ISSUES - PROBLEM Dx:

## 2019-08-29 NOTE — DISCHARGE NOTE PROVIDER - NSDCCPCAREPLAN_GEN_ALL_CORE_FT
PRINCIPAL DISCHARGE DIAGNOSIS  Diagnosis: Symptomatic bradycardia  Assessment and Plan of Treatment: suspected 2/2 RBBB

## 2019-08-29 NOTE — CONSULT NOTE ADULT - ASSESSMENT
Patient is a 69 year old man admitted 8/28/19 with dizziness and nausea. He states yesterday afternoon he was sitting at a computer and suddenly felt dizzy as if everything was spinning around him. He also noted a mild HA. He denies other associated neurological complaints. He noted nausea. He denies fever or trauma.  He called an ambulance and was brought to the ER. In the hospital he was given meclizine and zofran, He denies any dizziness today. He denies HA or other neurological complaints. Neurological exam as above, normal. Suggestive of Vestibular Neuronitis. Not suggestive of stroke.    1) CT head to evaluate hemorrhage, low suspicion.  2) As per Cardiology with regard to evaluation of abnormal EKG  3) Opthalmology Evaluation as an outpatient with regard to many year episodes of bright circular images as described above in evaluation of retinal disease. Episodes may be secondary to migraine equivalent.  4) As per Medicine    Discussed with Dr. Larkin.

## 2019-08-29 NOTE — CONSULT NOTE ADULT - ATTENDING COMMENTS
abnormal ekg  discussed possible underlying ischemic disease given hyperlipidemia and risk factors. diagnostic testing discussed including stress testing nuclear testing and angiography. plaint treadmill and echo is planned with a consideration to outpatient cardiac cta.    hyperlipidemia  check lipids    syncopal  no class I indication for pacing. probable vagal event

## 2019-08-29 NOTE — DISCHARGE NOTE PROVIDER - HOSPITAL COURSE
69 year old male, PMhx of HLD; BIBEMS for dizziness, sweating, nausea. Patient states he was sitting, using the computer and all of a sudden had stated symptoms.  Denies chest pain, but did have mild chest tightness, nausea and felt dizzy.  He was not feeling well, lasted for minutes, he felt he was about to pass out, so called EMS. When EMS arrived pt was found to be bradycardic in the 40s, pale and diaphoretic. EMS gave 0.5mg atropine, 4mg zofran, ASA 325mg.  Patient states he only felt sl relief but was still not totally well, still feeling dizzy and nauseous especially when he moves.  Denies cough, dyspnea, fever, chills, abd pain.  Pt states he last ate a [email or web address] sandwich at around 5 PM.      Pt states he's usually active, plays tennis 3 x a week and does a lot of yard work.    He had a stress test about 3 years ago w/c he thinks was neg.        Hospital course: EKG & Treadmill stress test reveals bradycardia with RBBB. CXR & Trending Trops were negative findings. TTE reveals Grade II diastolic dysfunction with LVEF of 64%. pt started on ASA and statin therapy. Cardiology consult (Dr. Larkin) and Neurology consult (Dr Hu) following; CT Head performed prior to transfer to [place] to [facility] cardiac cath with accepting MD: Dr. Douglas and ROSMERY Linder

## 2019-08-30 ENCOUNTER — OUTPATIENT (OUTPATIENT)
Dept: OUTPATIENT SERVICES | Facility: HOSPITAL | Age: 69
LOS: 1 days | End: 2019-08-30
Payer: MEDICARE

## 2019-08-30 ENCOUNTER — TRANSCRIPTION ENCOUNTER (OUTPATIENT)
Age: 69
End: 2019-08-30

## 2019-08-30 ENCOUNTER — INPATIENT (INPATIENT)
Facility: HOSPITAL | Age: 69
LOS: 0 days | Discharge: HOME | DRG: 287 | End: 2019-08-30
Attending: INTERNAL MEDICINE | Admitting: INTERNAL MEDICINE
Payer: COMMERCIAL

## 2019-08-30 VITALS
HEIGHT: 68 IN | HEART RATE: 45 BPM | RESPIRATION RATE: 16 BRPM | WEIGHT: 199.96 LBS | OXYGEN SATURATION: 98 % | SYSTOLIC BLOOD PRESSURE: 147 MMHG | DIASTOLIC BLOOD PRESSURE: 82 MMHG | TEMPERATURE: 98 F

## 2019-08-30 VITALS
TEMPERATURE: 98 F | OXYGEN SATURATION: 95 % | SYSTOLIC BLOOD PRESSURE: 146 MMHG | RESPIRATION RATE: 17 BRPM | HEART RATE: 48 BPM | DIASTOLIC BLOOD PRESSURE: 79 MMHG | WEIGHT: 205.69 LBS

## 2019-08-30 VITALS
RESPIRATION RATE: 17 BRPM | DIASTOLIC BLOOD PRESSURE: 78 MMHG | SYSTOLIC BLOOD PRESSURE: 152 MMHG | OXYGEN SATURATION: 96 % | HEART RATE: 54 BPM

## 2019-08-30 VITALS
SYSTOLIC BLOOD PRESSURE: 160 MMHG | OXYGEN SATURATION: 95 % | HEART RATE: 54 BPM | TEMPERATURE: 97 F | DIASTOLIC BLOOD PRESSURE: 74 MMHG | HEIGHT: 67 IN | RESPIRATION RATE: 18 BRPM | WEIGHT: 197.98 LBS

## 2019-08-30 DIAGNOSIS — Z98.890 OTHER SPECIFIED POSTPROCEDURAL STATES: Chronic | ICD-10-CM

## 2019-08-30 DIAGNOSIS — I25.10 ATHEROSCLEROTIC HEART DISEASE OF NATIVE CORONARY ARTERY WITHOUT ANGINA PECTORIS: ICD-10-CM

## 2019-08-30 DIAGNOSIS — Z90.49 ACQUIRED ABSENCE OF OTHER SPECIFIED PARTS OF DIGESTIVE TRACT: Chronic | ICD-10-CM

## 2019-08-30 DIAGNOSIS — I20.0 UNSTABLE ANGINA: ICD-10-CM

## 2019-08-30 LAB
ANION GAP SERPL CALC-SCNC: 15 MMOL/L — SIGNIFICANT CHANGE UP (ref 5–17)
BLD GP AB SCN SERPL QL: NEGATIVE — SIGNIFICANT CHANGE UP
BUN SERPL-MCNC: 17 MG/DL — SIGNIFICANT CHANGE UP (ref 7–23)
CALCIUM SERPL-MCNC: 9.7 MG/DL — SIGNIFICANT CHANGE UP (ref 8.4–10.5)
CHLORIDE SERPL-SCNC: 101 MMOL/L — SIGNIFICANT CHANGE UP (ref 96–108)
CO2 SERPL-SCNC: 22 MMOL/L — SIGNIFICANT CHANGE UP (ref 22–31)
CREAT SERPL-MCNC: 1.1 MG/DL — SIGNIFICANT CHANGE UP (ref 0.5–1.3)
GLUCOSE SERPL-MCNC: 116 MG/DL — HIGH (ref 70–99)
HCT VFR BLD CALC: 53.4 % — HIGH (ref 39–50)
HGB BLD-MCNC: 17.5 G/DL — HIGH (ref 13–17)
MCHC RBC-ENTMCNC: 29.5 PG — SIGNIFICANT CHANGE UP (ref 27–34)
MCHC RBC-ENTMCNC: 32.8 GM/DL — SIGNIFICANT CHANGE UP (ref 32–36)
MCV RBC AUTO: 89.9 FL — SIGNIFICANT CHANGE UP (ref 80–100)
PLATELET # BLD AUTO: 158 K/UL — SIGNIFICANT CHANGE UP (ref 150–400)
POTASSIUM SERPL-MCNC: 3.7 MMOL/L — SIGNIFICANT CHANGE UP (ref 3.5–5.3)
POTASSIUM SERPL-SCNC: 3.7 MMOL/L — SIGNIFICANT CHANGE UP (ref 3.5–5.3)
RBC # BLD: 5.94 M/UL — HIGH (ref 4.2–5.8)
RBC # FLD: 13.2 % — SIGNIFICANT CHANGE UP (ref 10.3–14.5)
RH IG SCN BLD-IMP: POSITIVE — SIGNIFICANT CHANGE UP
SODIUM SERPL-SCNC: 138 MMOL/L — SIGNIFICANT CHANGE UP (ref 135–145)
WBC # BLD: 7.68 K/UL — SIGNIFICANT CHANGE UP (ref 3.8–10.5)
WBC # FLD AUTO: 7.68 K/UL — SIGNIFICANT CHANGE UP (ref 3.8–10.5)

## 2019-08-30 PROCEDURE — 93454 CORONARY ARTERY ANGIO S&I: CPT | Mod: 26,GC

## 2019-08-30 PROCEDURE — 93306 TTE W/DOPPLER COMPLETE: CPT

## 2019-08-30 PROCEDURE — 87641 MR-STAPH DNA AMP PROBE: CPT

## 2019-08-30 PROCEDURE — 93010 ELECTROCARDIOGRAM REPORT: CPT

## 2019-08-30 PROCEDURE — G0463: CPT

## 2019-08-30 PROCEDURE — 93017 CV STRESS TEST TRACING ONLY: CPT

## 2019-08-30 PROCEDURE — 83735 ASSAY OF MAGNESIUM: CPT

## 2019-08-30 PROCEDURE — 36415 COLL VENOUS BLD VENIPUNCTURE: CPT

## 2019-08-30 PROCEDURE — 93880 EXTRACRANIAL BILAT STUDY: CPT

## 2019-08-30 PROCEDURE — ZZZZZ: CPT

## 2019-08-30 PROCEDURE — 99231 SBSQ HOSP IP/OBS SF/LOW 25: CPT

## 2019-08-30 PROCEDURE — 86901 BLOOD TYPING SEROLOGIC RH(D): CPT

## 2019-08-30 PROCEDURE — 80053 COMPREHEN METABOLIC PANEL: CPT

## 2019-08-30 PROCEDURE — 93005 ELECTROCARDIOGRAM TRACING: CPT

## 2019-08-30 PROCEDURE — 86850 RBC ANTIBODY SCREEN: CPT

## 2019-08-30 PROCEDURE — 86803 HEPATITIS C AB TEST: CPT

## 2019-08-30 PROCEDURE — 82607 VITAMIN B-12: CPT

## 2019-08-30 PROCEDURE — 87640 STAPH A DNA AMP PROBE: CPT

## 2019-08-30 PROCEDURE — 96374 THER/PROPH/DIAG INJ IV PUSH: CPT

## 2019-08-30 PROCEDURE — 80048 BASIC METABOLIC PNL TOTAL CA: CPT

## 2019-08-30 PROCEDURE — 70450 CT HEAD/BRAIN W/O DYE: CPT

## 2019-08-30 PROCEDURE — 86900 BLOOD TYPING SEROLOGIC ABO: CPT

## 2019-08-30 PROCEDURE — 84484 ASSAY OF TROPONIN QUANT: CPT

## 2019-08-30 PROCEDURE — 99239 HOSP IP/OBS DSCHRG MGMT >30: CPT

## 2019-08-30 PROCEDURE — 71045 X-RAY EXAM CHEST 1 VIEW: CPT

## 2019-08-30 PROCEDURE — 85027 COMPLETE CBC AUTOMATED: CPT

## 2019-08-30 PROCEDURE — 99152 MOD SED SAME PHYS/QHP 5/>YRS: CPT | Mod: GC

## 2019-08-30 PROCEDURE — 84443 ASSAY THYROID STIM HORMONE: CPT

## 2019-08-30 PROCEDURE — 99285 EMERGENCY DEPT VISIT HI MDM: CPT | Mod: 25

## 2019-08-30 PROCEDURE — 83036 HEMOGLOBIN GLYCOSYLATED A1C: CPT

## 2019-08-30 RX ORDER — AMLODIPINE BESYLATE 2.5 MG/1
5 TABLET ORAL DAILY
Refills: 0 | Status: DISCONTINUED | OUTPATIENT
Start: 2019-08-30 | End: 2019-08-30

## 2019-08-30 RX ORDER — ASPIRIN/CALCIUM CARB/MAGNESIUM 324 MG
81 TABLET ORAL DAILY
Refills: 0 | Status: DISCONTINUED | OUTPATIENT
Start: 2019-08-30 | End: 2019-08-30

## 2019-08-30 RX ORDER — ATORVASTATIN CALCIUM 80 MG/1
40 TABLET, FILM COATED ORAL AT BEDTIME
Refills: 0 | Status: DISCONTINUED | OUTPATIENT
Start: 2019-08-30 | End: 2019-08-30

## 2019-08-30 RX ADMIN — Medication 100 MILLIGRAM(S): at 05:33

## 2019-08-30 RX ADMIN — PANTOPRAZOLE SODIUM 40 MILLIGRAM(S): 20 TABLET, DELAYED RELEASE ORAL at 05:32

## 2019-08-30 NOTE — H&P PST ADULT - NSICDXPROBLEM_GEN_ALL_CORE_FT
PROBLEM DIAGNOSES  Problem: CAD, multiple vessel  Assessment and Plan: Coronary artery bypass graft X 3, will continue aspirin

## 2019-08-30 NOTE — DISCHARGE NOTE NURSING/CASE MANAGEMENT/SOCIAL WORK - PATIENT PORTAL LINK FT
You can access the FollowMyHealth Patient Portal offered by [facility] by registering at the following website: [email or web address]. By joining [employer]’s FollowMyHealth portal, you will also be able to view your health information using other applications (apps) compatible with our system.

## 2019-08-30 NOTE — H&P CARDIOLOGY - EKG AND INTERPRETATION
August 27, 2019     Dewayne Irene MD  [address]    Patient: Jasvir Cohen   YOB: 1943   Date of Visit: 8/27/2019       Dear Dr Meeks Medal: Thank you for referring Cammie King to me for evaluation  Below are my notes for this consultation  If you have questions, please do not hesitate to call me  I look forward to following your patient along with you  Sincerely,        Ronald Stone MD        CC: MD Ronald Stark MD  8/27/2019 10:55 AM  Sign at close encounter  Office Visit - General Surgery  Jasvir Cohen MRN: [id number]  Encounter: [id number]    Assessment and Plan    Problem List Items Addressed This Visit        Digestive    GERD (gastroesophageal reflux disease) - Primary    Relevant Medications    diazepam (VALIUM) 5 mg tablet    Other Relevant Orders    Esophageal manometry       Respiratory    Paraesophageal hernia     I discussed with him his paraesophageal hernia that was found at time of his initial surgery and reduced  I explained what it would entail to repair this  He has had some reflux symptoms before for which he took Tums  I told him I question is whether he needs a definitive anti reflux operation verses only repairing the hiatal hernia defect  I explained of like him to get esophageal manometry 1st   As I told him, I think doing a wrap would be better manometry would allow  He understands and would be agreeable  I discussed the surgery in detail including risks, benefits, alternatives, and what to expect postoperatively  He understands and is agreeable to go ahead  Would do this robotically or laparoscopically  Plan:  Robotic/laparoscopic paraesophageal hernia repair(Nissen fundoplication)               Chief Complaint:  Jasvir Cohen is a 77 y o  male who presents for Hernia (Consult hiatal hernia r/b Dr Cole Martin)    Subjective  78-year-old male referred for paraesophageal hernia  He had a distal pancreatectomy done and at that time was seen to have a large paraesophageal hernia that was reduced  He does have history more recently of having to vomit  He will eat to the point of fullness than have maybe 1 more bite and has to go make himself vomit to feel better  In the past had taken Tums for some reflux issues which made him feel better  He has had endoscopy that showed what appeared to be a sliding hiatal hernia  Here to discuss surgical repair    Past Medical History  Past Medical History:   Diagnosis Date    Abdominal pain     middle lower    Anesthesia     "after a right knee surgery years  ago, woke up patricia agitated/super angry wanted to break things and leave"    Arthritis     Benign neoplasm of pancreas     Chronic pain disorder     general arthritis    Constipation     Coronary artery disease     Gastrointestinal hemorrhage     hgb 5 8 in 5/2005; Last Assessed: 4/29/2016    GERD (gastroesophageal reflux disease)     Herpes zoster     Last Assessed: 12/17/2015    History of coronary artery stent placement     x2    History of shingles     History of total knee replacement, right     History of transfusion     years ago    Hyperlipidemia     Hypertension     Left inguinal hernia     Left knee pain     MI (myocardial infarction) ([address] )     in 2007    Myocardial infarction ([address] ) 04/2003    stent x2 LAD    Neuropathy     feet and left hand    Nicotine dependence     Post-operative complication 9/12/5121    Postherpetic neuralgia 12/2015    left low back;  Last Assessed: 4/29/2016    RA (rheumatoid arthritis) ([address] )     Right sided sciatica     Stroke ([address] )     Teeth missing     TIA (transient ischemic attack)     Umbilical hernia     Use of cane as ambulatory aid        Past Surgical History  Past Surgical History:   Procedure Laterality Date    ANGIOPLASTY      APPENDECTOMY      CARPAL TUNNEL RELEASE Right     CHOLECYSTECTOMY      COLONOSCOPY Complete    CORONARY ANGIOPLASTY WITH STENT PLACEMENT      LAD    DISTAL PANCREATECTOMY N/A 2019    Procedure: LAPAROSCOPIC HAND ASSISTED DISTAL PANCREATECTOMY;  Surgeon: Kerrie Thurman MD;  Location: [facility];  Service: Surgical Oncology    HERNIA REPAIR Right 2017    inguinal     JOINT REPLACEMENT Right     KNEE SURGERY Right     's due to a severe crush injury/ steel beam fell on knee/multiple surgeries to it over the years    MT [address] DUODENUM/JEJUNUM N/A 2018    Procedure: LINEAR ENDOSCOPIC U/S WITH EGD;  Surgeon: Hallie Mcintosh MD;  Location: [facility];   Service: Gastroenterology    MT REPAIR [address] HERNIA,5+Y/O,REDUCIBL Left 2017    Procedure: OPEN INGUINAL HERNIA REPAIR WITH MESH;  Surgeon: aPti Rubio MD;  Location: [facility];  Service: General    TONSILLECTOMY      TOTAL KNEE ARTHROPLASTY Right     WISDOM TOOTH EXTRACTION         Family History  Family History   Problem Relation Age of Onset    Diabetes Daughter         Type 1, with complications    Heart disease Mother     Bone cancer Father 76    Stomach cancer Sister         Late 42's    Cancer Brother         Unknown       Social History  Social History     Socioeconomic History    Marital status: /Civil Union     Spouse name: None    Number of children: None    Years of education: None    Highest education level: None   Occupational History    None   Social Needs    Financial resource strain: None    Food insecurity:     Worry: None     Inability: None    Transportation needs:     Medical: None     Non-medical: None   Tobacco Use    Smoking status: Former Smoker     Packs/day: 1 00     Years: 4 00     Pack years: 4 00     Types: Cigarettes     Last attempt to quit: 2019     Years since quittin 5    Smokeless tobacco: Never Used    Tobacco comment: pt former smoker quit in  started again in     Substance and Sexual Activity    Alcohol use: Never     Frequency: Never     Drinks per session: Patient refused     Binge frequency: Never    Drug use: No     Comment: chronic narcotic use per [employer]    Sexual activity: None   Lifestyle    Physical activity:     Days per week: None     Minutes per session: None    Stress: None   Relationships    Social connections:     Talks on phone: None     Gets together: None     Attends Hindu service: None     Active member of club or organization: None     Attends meetings of clubs or organizations: None     Relationship status: None    Intimate partner violence:     Fear of current or ex partner: None     Emotionally abused: None     Physically abused: None     Forced sexual activity: None   Other Topics Concern    None   Social History Narrative    None        Medications  Current Outpatient Medications on File Prior to Visit   Medication Sig Dispense Refill    aspirin 81 MG tablet Take 81 mg by mouth daily   atorvastatin (LIPITOR) 40 mg tablet Take 1 tablet (40 mg total) by mouth daily at bedtime 90 tablet 3    famotidine (PEPCID) 20 mg tablet TAKE 1 TABLET BY MOUTH EVERY 12 HOURS 60 tablet 5    metoprolol tartrate (LOPRESSOR) 50 mg tablet TAKE 1 TABLET BY MOUTH TWICE A DAY 60 tablet 5    oxyCODONE-acetaminophen (PERCOCET) 5-325 mg per tablet Take 1 tablet by mouth every 4 (four) hours as needed for moderate pain (take for knee pain (from PCP))Max Daily Amount: 6 tablets 20 tablet 0    diazepam (VALIUM) 5 mg tablet Take 1 tablet (5 mg total) by mouth every 6 (six) hours as needed for anxiety (Patient not taking: Reported on 4/30/2019) 3 tablet 0    nicotine (NICODERM CQ) 21 mg/24 hr TD 24 hr patch Place 1 patch on the skin daily (Patient not taking: Reported on 4/30/2019) 28 patch 0     No current facility-administered medications on file prior to visit          Allergies  Allergies   Allergen Reactions    Lyrica [Pregabalin] Other (See Comments)     Blurred vision, and altered mood/got angry/lost muscle tone    Morphine GI Intolerance    Morphine And Related GI Intolerance     "severe vomiting"    Chlorhexidine Itching     Itching after surgical shaving  Prep and wiped with DEBRA cloths    Other Itching     Anesthesia of unknown type; Awakening from anesthesia - furious, anger, got out of car and walked home postop    Abciximab Other (See Comments)     rec'd 3/27/03  Pt does not remember this med    Codeine Itching and GI Intolerance     Hot feeling    Prednisone GI Intolerance     Pt doesn't remember having problem with prednisone       Review of Systems   Constitutional: Negative for chills, fatigue and fever  HENT: Negative for congestion, ear pain, sneezing, trouble swallowing and voice change  Eyes: Negative for pain and discharge  Respiratory: Negative for cough, shortness of breath and wheezing  Cardiovascular: Negative for palpitations and leg swelling  Gastrointestinal: Negative for abdominal pain, constipation, diarrhea and nausea  Endocrine: Negative for cold intolerance, heat intolerance and polyuria  Genitourinary: Negative for decreased urine volume, difficulty urinating and dysuria  Musculoskeletal: Negative for arthralgias and back pain  Skin: Negative for rash and wound  Allergic/Immunologic: Negative for environmental allergies and food allergies  Neurological: Negative for dizziness and weakness  Hematological: Negative for adenopathy  Does not bruise/bleed easily  Psychiatric/Behavioral: Negative for confusion and sleep disturbance  The patient is not nervous/anxious  All other systems reviewed and are negative        Objective  Vitals:    08/27/19 0841   BP: 124/80   Pulse: 76   Temp: (!) 97 3 °F (36 3 °C)       Physical Exam sinus bradycardia at 45 bpm   RBBB

## 2019-08-30 NOTE — CONSULT NOTE ADULT - SUBJECTIVE AND OBJECTIVE BOX
History of Present Illness:  69y Male h/o HLD BIBEMS to [facility] on 8/28 for dizziness, sweating, nausea and visual changes reflecting vestibular neuronitis. Transferred to [facility] s/p abnormal stress test.  Presently asymptomatic denies: chest pain, dyspnea, dizziness, palpitations, N&V, HA.   LHC demonstrates 3VD. Pt referred to CTS for possible CABG.    Past Medical History  Right bundle branch block  HLD (hyperlipidemia)      Past Surgical History  No significant past surgical history      MEDICATIONS  (STANDING):  aspirin enteric coated 81 milliGRAM(s) Oral daily  atorvastatin 40 milliGRAM(s) Oral at bedtime    MEDICATIONS  (PRN):    Antiplatelet therapy:                           Last dose/amt:    Allergies: No Known Allergies      SOCIAL HISTORY:  Smoker: [ ] Yes  [x ] No        PACK YEARS:                         WHEN QUIT?  ETOH use: [ ] Yes  [x ] No              FREQUENCY / QUANTITY:  Ilicit Drug use:  [ ] Yes  [x ] No  Occupation:  Live with:  Assist device use:    Relevant Family History  FAMILY HISTORY:  No pertinent family history in first degree relatives      Review of Systems  GENERAL:  Fevers[] chills[] sweats[] fatigue[] weight loss[] weight gain []                                        NEURO:  parathesias[] seizures []  syncope [x]  confusion []                                                                                  EYES: glasses[]  blurry vision[]  discharge[] pain[] glaucoma []                                                                            ENMT:  difficulty hearing []  vertigo[]  dysphagia[] epistaxis[] recent dental work []                                      CV:  chest pain[] palpitations[] LOVETT [] diaphoresis [] edema[]                                                                                             RESPIRATORY:  wheezing[] SOB[] cough [] sputum[] hemoptysis[]                                                                    GI:  nausea[]  vomiting []  diarrhea[] constipation [] melena []                                                                        : hematuria[ ]  dysuria[ ] urgency[] incontinence[]                                                                                              MUSKULOSKELETAL:  arthritis[ ]  joint swelling [ ] muscle weakness [ ]                                                                  SKIN/BREAST:  rash[ ] itching [ ]  hair loss[ ] masses[ ]                                                                                                PSYCH:  dementia [ ] depresion [ ] anxiety[ ]                                                                                                                  HEME/LYMPH:  bruises easily[ ] enlarged lymph nodes[ ] tender lymph nodes[ ]                                                 ENDOCRINE:  cold intolerance[ ] heat intolerance[ ] polydipsia[ ]                                                                              PHYSICAL EXAM  Vital Signs Last 24 Hrs  T(C): 36.4 (30 Aug 2019 08:14), Max: 36.7 (30 Aug 2019 05:41)  T(F): 97.6 (30 Aug 2019 08:14), Max: 98.1 (30 Aug 2019 05:41)  HR: 50 (30 Aug 2019 12:35) (45 - 56)  BP: 144/70 (30 Aug 2019 12:35) (138/68 - 150/73)  BP(mean): 103 (30 Aug 2019 08:14) (103 - 103)  RR: 17 (30 Aug 2019 12:35) (16 - 17)  SpO2: 95% (30 Aug 2019 12:35) (95% - 98%)    General: Well nourished, well developed, no acute distress.                                                         Neuro: Normal exam oriented to person/place & time with no focal motor or sensory  deficits.                    Eyes: Normal exam of conjunctiva & lids, pupils equally reactive.   ENT: Normal exam of nasal/oral mucosa with absence of cyanosis.   Neck: Normal exam of jugular veins, trachea & thyroid.   Chest: Normal lung exam with good air movement absence of wheezes, rales, or rhonchi:                                                                          CV:  Auscultation: normal [ ] S3[ ] S4[ ] Irregular [ ] Rub[ ] Clicks[ ]  Murmurs none:[ ]systolic [ ]  diastolic [ ] holosystolic [ ]  Carotids: No Bruits[ ] Other____________ Abdominal Aorta: normal [ ] nonpalpable[ ]                                                                         GI: Normal exam of abdomen, liver & spleen with no noted masses or tenderness.                                                                                              Extremities: Normal no evidence of cyanosis or deformity Edema: none[ ]trace[ ]1+[ ]2+[ ]3+[ ]4+[ ]  Lower Extremity Pulses: Right[ ] Left[ ]Varicosities[ ]  SKIN : Normal exam to inspection & palpation.                                                           LABS:                        17.0   7.19  )-----------( 168      ( 28 Aug 2019 20:05 )             52.2     08-29    139  |  108  |  24<H>  ----------------------------<  109<H>  4.2   |  29  |  1.10    Ca    8.6      29 Aug 2019 05:30  Mg     2.1     08-29    TPro  7.7  /  Alb  3.5  /  TBili  0.8  /  DBili  x   /  AST  38  /  ALT  42  /  AlkPhos  63  08-28        CARDIAC MARKERS ( 29 Aug 2019 05:30 )  .019 ng/mL / x     / x     / x     / x      CARDIAC MARKERS ( 28 Aug 2019 23:40 )  .018 ng/mL / x     / x     / x     / x      CARDIAC MARKERS ( 28 Aug 2019 20:05 )  <.017 ng/mL / x     / x     / x     / x              Cardiac Cath:     TTE / JULIENNE: < from: TTE Echo Complete w/Doppler (08.29.19 @ 08:09) >  Summary:   1. Left ventricular ejection fraction, by visual estimation, is 64%.   2. Normal global left ventricular systolic function.   3. Spectral Doppler shows pseudonormal pattern of left ventricular   myocardial filling (Grade II diastolic dysfunction).   4. Mild thickening and calcification of the anterior and posterior  mitral valve leaflets.   5. Mild aortic regurgitation.   6. Estimated pulmonary artery systolic pressure is 37.3 mmHg assuming a   right atrial pressure of 10 mmHg, which is consistent with borderline   pulmonary hypertension.   7. LA volume Index is 31.6 ml/m² ml/m2.    < end of copied text >

## 2019-08-30 NOTE — DISCHARGE NOTE PROVIDER - NSDCCPCAREPLAN_GEN_ALL_CORE_FT
PRINCIPAL DISCHARGE DIAGNOSIS  Diagnosis: Abnormal stress test  Assessment and Plan of Treatment:       SECONDARY DISCHARGE DIAGNOSES  Diagnosis: HLD (hyperlipidemia)  Assessment and Plan of Treatment: Continue with your cholesterol medications. Eat a heart healthy diet that is low in saturated fats and salt, and includes whole grains, fruits, vegetables and lean protein; exercise regularly (consult with your physician or cardiologist first); maintain a heart healthy weight; if you smoke - quit (A resource to help you stop smoking is the [facility] – phone number [phone].). Continue to follow with your primary physician or cardiologist.

## 2019-08-30 NOTE — DISCHARGE NOTE PROVIDER - HOSPITAL COURSE
This is a 69 year old  male, with no implantable cardiac device/s, with  PMhx of HLD; BIBEMS to [facility] on 8/28 for c/c of  dizziness, sweating, nausea. Patient states he was sitting, using the computer and all of a sudden had stated symptoms.  Denies chest pain, but did have mild chest tightness, nausea and felt dizzy.  He was not feeling well, lasted for minutes, he felt he was about to pass out, so called EMS. When EMS arrived pt was found to be bradycardic in the 40s, pale and diaphoretic. EMS gave 0.5mg atropine, 4mg zofran, ASA 325mg.  Patient states he only felt sl relief but was still not totally well, still feeling dizzy and nauseous especially when he moves.  Denies cough, dyspnea, fever, chills, abd pain.  Pt states he's usually active, plays tennis 3 x a week and does a lot of yard work. He had a stress test about 3 years ago w/c he thinks was neg.  At [facility] w/ abnormal ekg cardiology Dr Larkin discussed possible underlying ischemic disease given hyperlipidemia and risk factors. diagnostic testing discussed including stress testing nuclear testing and angiography.  Syncopal episode believed of vagal etiology vs vertigo. Trops negative X3, Exercise stress test on 8/29 was abnormal ( see report below), transferred to [facility] today for [facility] for further evaluation.  Presently asymptomatic denies: chest pain, dyspnea, dizziness, palpitations, N&V, HA.     s/p diagnostic cath via RRA revealed TVD, pt to be dc home after cath recovery to go to [facility] for CTS work up and to come back next week Tuesday ( as per Dr Stafford)         IN ADDITION: neuro at [facility] DR Hu consulted for PMH of: Three year history of episodes of a bright [place] forming in the middle of his visual field which enlarges and then disappears. He states has been increasing in frequency for the past 6 months to twice per week. Not associated with HA.He denies any dizziness today. He denies HA or other neurological complaints. Neurological exam as above, normal. Suggestive of Vestibular Neuronitis. Not suggestive of stroke.  CTH negative    - patient needs Opthalmology Evaluation as an outpatient with regard to many year episodes of bright circular images as described above in evaluation of retinal disease. Episodes may be secondary to migraine equivalent.

## 2019-08-30 NOTE — DISCHARGE NOTE PROVIDER - CARE PROVIDER_API CALL
Niya Stafford)  Thoracic and Cardiac Surgery  [address] [place] [address]  Phone: [phone]  Fax: [phone]  Follow Up Time:

## 2019-08-30 NOTE — H&P CARDIOLOGY - HISTORY OF PRESENT ILLNESS
This is a 69 year old  male, with no implantable cardiac device/s, with  PMhx of HLD; BIBEMS to [facility] on 8/28 for c/c of  dizziness, sweating, nausea. Patient states he was sitting, using the computer and all of a sudden had stated symptoms.  Denies chest pain, but did have mild chest tightness, nausea and felt dizzy.  He was not feeling well, lasted for minutes, he felt he was about to pass out, so called EMS. When EMS arrived pt was found to be bradycardic in the 40s, pale and diaphoretic. EMS gave 0.5mg atropine, 4mg zofran, ASA 325mg.  Patient states he only felt sl relief but was still not totally well, still feeling dizzy and nauseous especially when he moves.  Denies cough, dyspnea, fever, chills, abd pain.  Pt states he's usually active, plays tennis 3 x a week and does a lot of yard work. He had a stress test about 3 years ago w/c he thinks was neg.  At [facility] w/ abnormal ekg cardiology Dr Larkin discussed possible underlying ischemic disease given hyperlipidemia and risk factors. diagnostic testing discussed including stress testing nuclear testing and angiography.  Syncopal episode believed of vagal etiology vs vertigo. Trops negative X3, Exercise stress test on 8/29 was abnormal ( see report below), transferred to [facility] today for [facility] for further evaluation.  Presently asymptomatic denies: chest pain, dyspnea, dizziness, palpitations, N&V, HA.     IN ADDITION: neuro at [facility] DR Hu consulted for PMH of: Three year history of episodes of a bright [place] forming in the middle of his visual field which enlarges and then disappears. He states has been increasing in frequency for the past 6 months to twice per week. Not associated with HA.He denies any dizziness today. He denies HA or other neurological complaints. Neurological exam as above, normal. Suggestive of Vestibular Neuronitis. Not suggestive of stroke.  CTH negative  - patient needs Opthalmology Evaluation as an outpatient with regard to many year episodes of bright circular images as described above in evaluation of retinal disease. Episodes may be secondary to migraine equivalent.        < from: Cardiac Treadmill Stress Test (Non Imaging)-Adult (08.29.19 @ 13:58) >   Summary      Time In Exercise Phase_^00:08:58^_      Max. Systolic BP_^140^_mmHg      Max Diastolic BP_^58^_mmHg      Max Heart Rate_^112^_BPM      Max Predicted Heart Rate_^152^_BPM      Target HR Formula_^(220- Age)*100%^_      Reason for Test_^Syncope^_      Arrhythmias_^none^_      Resting ECG_^Regular Sinus Rhythm^_      ST Changes_^depression horizontal^_      Overall Impression_^Abnormal stress test^_      Chest Pain_^none^_      HR Response To Exercise_^appropriate^_      BP Response To Exercise_^normal resting BP - appropriate response^_      Reason For Termination_^Fatigue^_      Functional Capacity_^FAIR^_     Diagnois Line sinus bradycardia complete right bundle branch blocknon specific ST Twave appearance     Diagnois Line in response to exercise there was the      Diagnois Line  appearance marked 2-4 horizontal and downsloping ST segment depression II, III,     Diagnois Line  avF v4-V6     Diagnois Line limitations of stress testing discussed with patient     < end of copied text >    < from: TTE Echo Complete w/Doppler (08.29.19 @ 08:09) >    Summary:   1. Left ventricular ejection fraction, by visual estimation, is 64%.   2. Normal global left ventricular systolic function.   3. Spectral Doppler shows pseudonormal pattern of left ventricular   myocardial filling (Grade II diastolic dysfunction).   4. Mild thickening and calcification of the anterior and posterior  mitral valve leaflets.   5. Mild aortic regurgitation.   6. Estimated pulmonary artery systolic pressure is 37.3 mmHg assuming a   right atrial pressure of 10 mmHg, which is consistent with borderline   pulmonary hypertension.   7. LA volume Index is 31.6 ml/m² ml/m2.    < end of copied text >              < from: CT Head No Cont (08.29.19 @ 16:29) >  EXAM:  CT BRAIN      PROCEDURE DATE:  08/29/2019        INTERPRETATION:  CLINICAL STATEMENT: Evaluate hemorrhage, or vertigo and   nausea    TECHNIQUE: CT of the head was performed without IV contrast.    COMPARISON: None.    FINDINGS:    There is diffuse parenchymal volume loss. There are areas of low   attenuation in the periventricular white matter likely related to chronic   microvascular ischemic changes.    There is no acute parenchymal hemorrhage, parenchymal mass, mass effect   or midline shift. There is no extra-axial fluid collection. There is no   acute territorial infarct.     The ventricles are prominent likely secondary to central atrophy. There   is intracranial atherosclerosis    The calvarium is intact. The visualized paranasal sinuses demonstrate   mild mucosal thickening. The visualized orbits are unremarkable.    IMPRESSION:    No acute intracranial hemorrhage or acute territorial infarct.     < end of copied text >

## 2019-08-30 NOTE — H&P PST ADULT - NSICDXPASTSURGICALHX_GEN_ALL_CORE_FT
PAST SURGICAL HISTORY:  H/O removal of cyst buttock    S/P appendectomy     S/P hernia repair     S/P tendon repair wrist

## 2019-08-30 NOTE — CONSULT NOTE ADULT - PROBLEM SELECTOR RECOMMENDATION 9
c/w asa, statin, bblocker  presurgical testing at 4:30 pm  Discharge home  Anticipate CABG with Dr Stafford Tuadelfoday [id number]

## 2019-08-30 NOTE — CONSULT NOTE ADULT - ASSESSMENT
69y Male h/o HLD [facility], sweating, nausea and visual changes reflecting vestibular neuronitis. LHC demonstrates 3VD. Pt referred to CTS for possible CABG.

## 2019-08-30 NOTE — H&P PST ADULT - ASSESSMENT
BONNIEI VTE 2.0 SCORE [CLOT updated 2019]    AGE RELATED RISK FACTORS                                                       MOBILITY RELATED FACTORS  [ ] Age 41-60 years                                            (1 Point)                    [ ] Bed rest                                                        (1 Point)  [x ] Age: 61-74 years                                           (2 Points)                  [ ] Plaster cast                                                   (2 Points)  [ ] Age= 75 years                                              (3 Points)                    [ ] Bed bound for more than 72 hours                 (2 Points)    DISEASE RELATED RISK FACTORS                                               GENDER SPECIFIC FACTORS  [ ] Edema in the lower extremities                       (1 Point)              [ ] Pregnancy                                                     (1 Point)  [ ] Varicose veins                                               (1 Point)                     [ ] Post-partum < 6 weeks                                   (1 Point)             [x ] BMI > 25 Kg/m2                                            (1 Point)                     [ ] Hormonal therapy  or oral contraception          (1 Point)                 [ ] Sepsis (in the previous month)                        (1 Point)               [ ] History of pregnancy complications                 (1 point)  [ ] Pneumonia or serious lung disease                                               [ ] Unexplained or recurrent                     (1 Point)           (in the previous month)                               (1 Point)  [ ] Abnormal pulmonary function test                     (1 Point)                 SURGERY RELATED RISK FACTORS  [ ] Acute myocardial infarction                              (1 Point)               [ ]  Section                                             (1 Point)  [ ] Congestive heart failure (in the previous month)  (1 Point)      [ ] Minor surgery                                                  (1 Point)   [ ] Inflammatory bowel disease                             (1 Point)               [ ] Arthroscopic surgery                                        (2 Points)  [ ] Central venous access                                      (2 Points)                [x ] General surgery lasting more than 45 minutes (2 points)  [ ] Malignancy- Present or previous                   (2 Points)                [ ] Elective arthroplasty                                         (5 points)    [ ] Stroke (in the previous month)                          (5 Points)                                                                                                                                                           HEMATOLOGY RELATED FACTORS                                                 TRAUMA RELATED RISK FACTORS  [ ] Prior episodes of VTE                                     (3 Points)                [ ] Fracture of the hip, pelvis, or leg                       (5 Points)  [ ] Positive family history for VTE                         (3 Points)             [ ] Acute spinal cord injury (in the previous month)  (5 Points)  [ ] Prothrombin 53707 A                                     (3 Points)               [ ] Paralysis  (less than 1 month)                             (5 Points)  [ ] Factor V Leiden                                             (3 Points)                  [ ] Multiple Trauma within 1 month                        (5 Points)  [ ] Lupus anticoagulants                                     (3 Points)                                                           [ ] Anticardiolipin antibodies                               (3 Points)                                                       [ ] High homocysteine in the blood                      (3 Points)                                             [ ] Other congenital or acquired thrombophilia      (3 Points)                                                [ ] Heparin induced thrombocytopenia                  (3 Points)                                     Total Score [     5     ]

## 2019-08-30 NOTE — H&P PST ADULT - HISTORY OF PRESENT ILLNESS
68 y/o M PMH presented to [facility] with c/o 68 y/o M PMH RBBB, presented to [facility] 8/28/19 with c/o vertigo, nausea and diaphoresis, had an abnormal stress test, S/P cardiac catheterization today which revealed TVD.  Presents today for CABG X 3.

## 2019-08-31 LAB
HBA1C BLD-MCNC: 5.6 % — SIGNIFICANT CHANGE UP (ref 4–5.6)
MRSA PCR RESULT.: SIGNIFICANT CHANGE UP
S AUREUS DNA NOSE QL NAA+PROBE: SIGNIFICANT CHANGE UP

## 2019-09-19 PROCEDURE — C1769: CPT

## 2019-09-19 PROCEDURE — 87640 STAPH A DNA AMP PROBE: CPT

## 2019-09-19 PROCEDURE — 99152 MOD SED SAME PHYS/QHP 5/>YRS: CPT

## 2019-09-19 PROCEDURE — 86850 RBC ANTIBODY SCREEN: CPT

## 2019-09-19 PROCEDURE — 83036 HEMOGLOBIN GLYCOSYLATED A1C: CPT

## 2019-09-19 PROCEDURE — 86900 BLOOD TYPING SEROLOGIC ABO: CPT

## 2019-09-19 PROCEDURE — 99153 MOD SED SAME PHYS/QHP EA: CPT

## 2019-09-19 PROCEDURE — 93458 L HRT ARTERY/VENTRICLE ANGIO: CPT

## 2019-09-19 PROCEDURE — 93454 CORONARY ARTERY ANGIO S&I: CPT

## 2019-09-19 PROCEDURE — 93005 ELECTROCARDIOGRAM TRACING: CPT

## 2019-09-19 PROCEDURE — G0463: CPT

## 2019-09-19 PROCEDURE — C1887: CPT

## 2019-09-19 PROCEDURE — 87641 MR-STAPH DNA AMP PROBE: CPT

## 2019-09-19 PROCEDURE — 80048 BASIC METABOLIC PNL TOTAL CA: CPT

## 2019-09-19 PROCEDURE — 86901 BLOOD TYPING SEROLOGIC RH(D): CPT

## 2019-09-19 PROCEDURE — C1894: CPT

## 2019-09-19 PROCEDURE — 85027 COMPLETE CBC AUTOMATED: CPT

## 2020-02-04 ENCOUNTER — APPOINTMENT (OUTPATIENT)
Dept: INTERNAL MEDICINE | Facility: CLINIC | Age: 70
End: 2020-02-04
Payer: MEDICARE

## 2020-02-04 VITALS
DIASTOLIC BLOOD PRESSURE: 78 MMHG | RESPIRATION RATE: 15 BRPM | BODY MASS INDEX: 30.26 KG/M2 | WEIGHT: 199 LBS | HEART RATE: 76 BPM | SYSTOLIC BLOOD PRESSURE: 124 MMHG

## 2020-02-04 VITALS — HEIGHT: 68 IN

## 2020-02-04 DIAGNOSIS — Z00.00 ENCOUNTER FOR GENERAL ADULT MEDICAL EXAMINATION W/OUT ABNORMAL FINDINGS: ICD-10-CM

## 2020-02-04 PROCEDURE — G0444 DEPRESSION SCREEN ANNUAL: CPT | Mod: 59

## 2020-02-04 PROCEDURE — G0439: CPT

## 2020-02-04 RX ORDER — AZITHROMYCIN 250 MG/1
250 TABLET, FILM COATED ORAL
Qty: 6 | Refills: 1 | Status: DISCONTINUED | COMMUNITY
Start: 2018-10-19 | End: 2020-02-04

## 2020-02-04 RX ORDER — AZITHROMYCIN 250 MG/1
250 TABLET, FILM COATED ORAL
Qty: 6 | Refills: 0 | Status: DISCONTINUED | COMMUNITY
Start: 2017-10-17 | End: 2020-02-04

## 2020-02-04 RX ORDER — AZITHROMYCIN 250 MG/1
250 TABLET, FILM COATED ORAL
Qty: 6 | Refills: 1 | Status: DISCONTINUED | COMMUNITY
Start: 2018-11-21 | End: 2020-02-04

## 2020-02-04 RX ORDER — TRAMADOL HYDROCHLORIDE 50 MG/1
50 TABLET, COATED ORAL
Qty: 20 | Refills: 3 | Status: DISCONTINUED | COMMUNITY
Start: 2018-04-02 | End: 2020-02-04

## 2020-02-04 NOTE — REVIEW OF SYSTEMS
[Negative] : Psychiatric [Fever] : no fever [Chills] : no chills [Hot Flashes] : no hot flashes [Night Sweats] : no night sweats [Fatigue] : no fatigue [Discharge] : no discharge [Recent Change In Weight] : ~T no recent weight change [Pain] : no pain [Redness] : no redness [Itching] : no itching [Vision Problems] : no vision problems [Dryness] : no dryness [Earache] : no earache [Hearing Loss] : no hearing loss [Nosebleeds] : no nosebleeds [Postnasal Drip] : no postnasal drip [Sore Throat] : no sore throat [Nasal Discharge] : no nasal discharge [Hoarseness] : no hoarseness [Chest Pain] : no chest pain [Palpitations] : no palpitations [Claudication] : no  leg claudication [Lower Ext Edema] : no lower extremity edema [Orthopena] : no orthopnea [Paroxysmal Nocturnal Dyspnea] : no paroxysmal nocturnal dyspnea [Shortness Of Breath] : no shortness of breath [Wheezing] : no wheezing [Cough] : no cough [Dyspnea on Exertion] : not dyspnea on exertion [Abdominal Pain] : no abdominal pain [Nausea] : no nausea [Diarrhea] : no diarrhea [Constipation] : no constipation [Melena] : no melena [Vomiting] : no vomiting [Heartburn] : no heartburn [Dysuria] : no dysuria [Incontinence] : no incontinence [Nocturia] : no nocturia [Hematuria] : no hematuria [Hesitancy] : no hesitancy [Frequency] : no frequency [Impotence] : no impotency [Poor Libido] : libido not poor [Joint Pain] : no joint pain [Joint Stiffness] : no joint stiffness [Muscle Pain] : no muscle pain [Muscle Weakness] : no muscle weakness [Back Pain] : no back pain [Joint Swelling] : no joint swelling [Mole Changes] : no mole changes [Nail Changes] : no nail changes [Hair Changes] : no hair changes [Skin Rash] : no skin rash [Dizziness] : no dizziness [Headache] : no headache [Confusion] : no confusion [Unsteady Walk] : no ataxia [Fainting] : no fainting [Memory Loss] : no memory loss [Suicidal] : not suicidal [Anxiety] : no anxiety [Depression] : no depression [Insomnia] : no insomnia [Easy Bleeding] : no easy bleeding [Easy Bruising] : no easy bruising [Swollen Glands] : no swollen glands

## 2020-02-04 NOTE — HISTORY OF PRESENT ILLNESS
[FreeTextEntry1] : Comes to the office for a comprehensive physical examination and review his medications and discuss his overall health recently he has undergone an angiogram and placement of 2 cardiac stents [de-identified] : 69-year-old man comes to the office after not being seen for 2-1/2 years with a history of a recent cardiac catheterization with the placement of 2 stents at [facility] patient's history also significant for hyperlipidemia denies temperature chills sweats or myalgias he's had no headaches sinus congestion sore throat cough wheezing pleurisy chest pain shortness of breath exertional dyspnea lightheadedness palpitations dizziness vertigo or syncope he has had no abdominal pain nausea or vomiting diarrhea constipation bright red blood per rectum or black stools appetite has been good his weight has been stable denies significant musculoskeletal complaints he is still playing tennis he has had no leg edema denies any recent skin rashes

## 2020-02-04 NOTE — HEALTH RISK ASSESSMENT
[HIV test declined] : HIV test declined [Hepatitis C test declined] : Hepatitis C test declined [] : No [Yes] : Yes [No falls in past year] : Patient reported no falls in the past year [0] : 2) Feeling down, depressed, or hopeless: Not at all (0) [ColonoscopyDate] : 04/17 [ENE6Ocdga] : 0

## 2020-02-04 NOTE — PHYSICAL EXAM
[Well Nourished] : well nourished [No Acute Distress] : no acute distress [Well Developed] : well developed [Normal Voice/Communication] : normal voice/communication [Well-Appearing] : well-appearing [PERRL] : pupils equal round and reactive to light [Normal Sclera/Conjunctiva] : normal sclera/conjunctiva [EOMI] : extraocular movements intact [Normal Outer Ear/Nose] : the outer ears and nose were normal in appearance [Normal Oropharynx] : the oropharynx was normal [Normal TMs] : both tympanic membranes were normal [Normal Nasal Mucosa] : the nasal mucosa was normal [No JVD] : no jugular venous distention [Supple] : supple [No Lymphadenopathy] : no lymphadenopathy [Thyroid Normal, No Nodules] : the thyroid was normal and there were no nodules present [No Accessory Muscle Use] : no accessory muscle use [No Respiratory Distress] : no respiratory distress  [Clear to Auscultation] : lungs were clear to auscultation bilaterally [Normal Rate] : normal rate  [Regular Rhythm] : with a regular rhythm [Normal Percussion] : the chest was normal to percussion [No Murmur] : no murmur heard [Normal S1, S2] : normal S1 and S2 [No Carotid Bruits] : no carotid bruits [No Abdominal Bruit] : a ~M bruit was not heard ~T in the abdomen [No Varicosities] : no varicosities [Pedal Pulses Present] : the pedal pulses are present [No Edema] : there was no peripheral edema [No Palpable Aorta] : no palpable aorta [No Extremity Clubbing/Cyanosis] : no extremity clubbing/cyanosis [Declined Breast Exam] : declined breast exam  [Soft] : abdomen soft [Non-distended] : non-distended [Non Tender] : non-tender [No HSM] : no HSM [No Masses] : no abdominal mass palpated [Normal Bowel Sounds] : normal bowel sounds [No Hernias] : no hernias [Declined Rectal Exam] : declined rectal exam [Normal Axillary Nodes] : no axillary lymphadenopathy [Normal Supraclavicular Nodes] : no supraclavicular lymphadenopathy [Normal Posterior Cervical Nodes] : no posterior cervical lymphadenopathy [Normal Anterior Cervical Nodes] : no anterior cervical lymphadenopathy [Normal Inguinal Nodes] : no inguinal lymphadenopathy [Normal Femoral Nodes] : no femoral lymphadenopathy [No CVA Tenderness] : no CVA  tenderness [No Spinal Tenderness] : no spinal tenderness [No Rash] : no rash [Grossly Normal Strength/Tone] : grossly normal strength/tone [No Joint Swelling] : no joint swelling [No Skin Lesions] : no skin lesions [Coordination Grossly Intact] : coordination grossly intact [Deep Tendon Reflexes (DTR)] : deep tendon reflexes were 2+ and symmetric [Normal Gait] : normal gait [No Focal Deficits] : no focal deficits [Normal Affect] : the affect was normal [Memory Grossly Normal] : memory grossly normal [Speech Grossly Normal] : speech grossly normal [Alert and Oriented x3] : oriented to person, place, and time [Normal Mood] : the mood was normal [Normal Insight/Judgement] : insight and judgment were intact [Kyphosis] : no kyphosis [Scoliosis] : no scoliosis [Acne] : no acne

## 2020-02-04 NOTE — ASSESSMENT
[FreeTextEntry1] : Physical exam shows a well-developed man in no acute distress blood pressure 124/78 height 5 feet 8 inches weight 199 pounds BMI 30.26 heart rate 76 respirations are 15 HEENT was unremarkable chest was clear cardiovascular exam showed a normal S1 and S2 with no extra heart sounds or murmurs abdomen was soft and nontender extremities showed no clubbing cyanosis or edema neurologic exam was nonfocal patient will fluid from his cardiologist a recent EKG and echocardiogram patient continues on Effient  and aspirin as his stents were put in 6 months ago  patient's last colonoscopy was in April of 2017 he was given the name of to ophthalmologist as it has been a while since he has had his eyes examined. He is up-to-date on his dermatologist patient is active and plans on as union his correction and increase his activity and exercise a slip was given for complete blood test including CBC full chemistries PSA lipid profile thyroid profile hemoglobin A1c urinalysis CRP and vitamin C 3 and B12 measles mumps and rubella antibodies will also be performed

## 2020-02-25 ENCOUNTER — LABORATORY RESULT (OUTPATIENT)
Age: 70
End: 2020-02-25

## 2020-03-11 LAB
25(OH)D3 SERPL-MCNC: 38.6 NG/ML
ALBUMIN SERPL ELPH-MCNC: 4.3 G/DL
ALP BLD-CCNC: 57 U/L
ALT SERPL-CCNC: 33 U/L
ANION GAP SERPL CALC-SCNC: 12 MMOL/L
APPEARANCE: CLEAR
AST SERPL-CCNC: 33 U/L
BASOPHILS # BLD AUTO: 0.08 K/UL
BASOPHILS NFR BLD AUTO: 1.4 %
BILIRUB SERPL-MCNC: 1.6 MG/DL
BILIRUBIN URINE: NEGATIVE
BLOOD URINE: NEGATIVE
BUN SERPL-MCNC: 23 MG/DL
CALCIUM SERPL-MCNC: 9.7 MG/DL
CHLORIDE SERPL-SCNC: 105 MMOL/L
CHOLEST SERPL-MCNC: 143 MG/DL
CHOLEST/HDLC SERPL: 2.6 RATIO
CO2 SERPL-SCNC: 24 MMOL/L
COLOR: YELLOW
CREAT SERPL-MCNC: 1.19 MG/DL
CRP SERPL HS-MCNC: 0.62 MG/L
EOSINOPHIL # BLD AUTO: 0.13 K/UL
EOSINOPHIL NFR BLD AUTO: 2.3 %
ESTIMATED AVERAGE GLUCOSE: 114 MG/DL
GLUCOSE BS SERPL-MCNC: 90 MG/DL
GLUCOSE QUALITATIVE U: NEGATIVE
GLUCOSE SERPL-MCNC: 99 MG/DL
HBA1C MFR BLD HPLC: 5.6 %
HCT VFR BLD CALC: 53.4 %
HDLC SERPL-MCNC: 55 MG/DL
HGB BLD-MCNC: 17 G/DL
IMM GRANULOCYTES NFR BLD AUTO: 0 %
KETONES URINE: NEGATIVE
LDLC SERPL CALC-MCNC: 64 MG/DL
LEUKOCYTE ESTERASE URINE: NEGATIVE
LYMPHOCYTES # BLD AUTO: 1.2 K/UL
LYMPHOCYTES NFR BLD AUTO: 21.4 %
MAN DIFF?: NORMAL
MCHC RBC-ENTMCNC: 28.3 PG
MCHC RBC-ENTMCNC: 31.8 GM/DL
MCV RBC AUTO: 88.9 FL
MEV IGG FLD QL IA: >300 AU/ML
MEV IGG+IGM SER-IMP: POSITIVE
MONOCYTES # BLD AUTO: 0.59 K/UL
MONOCYTES NFR BLD AUTO: 10.5 %
NEUTROPHILS # BLD AUTO: 3.61 K/UL
NEUTROPHILS NFR BLD AUTO: 64.4 %
NITRITE URINE: NEGATIVE
PH URINE: 6
PLATELET # BLD AUTO: 160 K/UL
POTASSIUM SERPL-SCNC: 5.1 MMOL/L
PROT SERPL-MCNC: 7.1 G/DL
PROTEIN URINE: ABNORMAL
PSA FREE FLD-MCNC: 47 %
PSA FREE SERPL-MCNC: 0.17 NG/ML
PSA SERPL-MCNC: 0.36 NG/ML
RBC # BLD: 6.01 M/UL
RBC # FLD: 12.3 %
RUBV IGG FLD-ACNC: 23.1 INDEX
RUBV IGG SER-IMP: POSITIVE
SODIUM SERPL-SCNC: 141 MMOL/L
SPECIFIC GRAVITY URINE: 1.02
T4 FREE SERPL-MCNC: 1 NG/DL
TRIGL SERPL-MCNC: 125 MG/DL
TSH SERPL-ACNC: 1.97 UIU/ML
UROBILINOGEN URINE: NORMAL
VIT B12 SERPL-MCNC: 486 PG/ML
WBC # FLD AUTO: 5.61 K/UL

## 2020-09-04 ENCOUNTER — NON-APPOINTMENT (OUTPATIENT)
Age: 70
End: 2020-09-04

## 2020-09-04 ENCOUNTER — APPOINTMENT (OUTPATIENT)
Dept: CARDIOLOGY | Facility: CLINIC | Age: 70
End: 2020-09-04
Payer: MEDICARE

## 2020-09-04 VITALS
WEIGHT: 198 LBS | HEART RATE: 53 BPM | OXYGEN SATURATION: 96 % | BODY MASS INDEX: 30.01 KG/M2 | SYSTOLIC BLOOD PRESSURE: 150 MMHG | HEIGHT: 68 IN | DIASTOLIC BLOOD PRESSURE: 81 MMHG

## 2020-09-04 DIAGNOSIS — R22.0 LOCALIZED SWELLING, MASS AND LUMP, HEAD: ICD-10-CM

## 2020-09-04 DIAGNOSIS — Z80.1 FAMILY HISTORY OF MALIGNANT NEOPLASM OF TRACHEA, BRONCHUS AND LUNG: ICD-10-CM

## 2020-09-04 DIAGNOSIS — Z86.69 PERSONAL HISTORY OF OTHER DISEASES OF THE NERVOUS SYSTEM AND SENSE ORGANS: ICD-10-CM

## 2020-09-04 DIAGNOSIS — Z23 ENCOUNTER FOR IMMUNIZATION: ICD-10-CM

## 2020-09-04 DIAGNOSIS — Z87.39 PERSONAL HISTORY OF OTHER DISEASES OF THE MUSCULOSKELETAL SYSTEM AND CONNECTIVE TISSUE: ICD-10-CM

## 2020-09-04 DIAGNOSIS — H57.89 OTHER SPECIFIED DISORDERS OF EYE AND ADNEXA: ICD-10-CM

## 2020-09-04 DIAGNOSIS — Z82.49 FAMILY HISTORY OF ISCHEMIC HEART DISEASE AND OTHER DISEASES OF THE CIRCULATORY SYSTEM: ICD-10-CM

## 2020-09-04 DIAGNOSIS — Z87.898 PERSONAL HISTORY OF OTHER SPECIFIED CONDITIONS: ICD-10-CM

## 2020-09-04 DIAGNOSIS — Z87.09 PERSONAL HISTORY OF OTHER DISEASES OF THE RESPIRATORY SYSTEM: ICD-10-CM

## 2020-09-04 PROCEDURE — 99205 OFFICE O/P NEW HI 60 MIN: CPT

## 2020-09-04 PROCEDURE — 93000 ELECTROCARDIOGRAM COMPLETE: CPT

## 2020-09-04 RX ORDER — CIPROFLOXACIN AND DEXAMETHASONE 3; 1 MG/ML; MG/ML
0.3-0.1 SUSPENSION/ DROPS AURICULAR (OTIC) TWICE DAILY
Qty: 1 | Refills: 1 | Status: DISCONTINUED | COMMUNITY
Start: 2020-04-17 | End: 2020-09-04

## 2020-09-04 RX ORDER — PRASUGREL 10 MG/1
TABLET, FILM COATED ORAL
Refills: 0 | Status: DISCONTINUED | COMMUNITY
End: 2020-09-04

## 2020-09-04 NOTE — REASON FOR VISIT
[Consultation] : a consultation regarding [Coronary Artery Disease] : coronary artery disease [FreeTextEntry1] : 2nd opinion

## 2020-09-04 NOTE — HISTORY OF PRESENT ILLNESS
[FreeTextEntry1] : Presents for evaluation and second opinion regarding management of his coronary artery disease. His history dates back to when he initially presented 1 year ago to [facility] (Dr. Larkin) with UA and was transferred to [facility] and had CC which showed 3 vessel CAD.  CABG was recommended but due to the presence of a long weekend, surgery was schedule for after weekend and he left hospital and sought care with Dr. Morgan at [place] who subsequently recommended PCI and he had stent placement in LAD and LCX (RCA was 100 % and filled via collaterals and was not addressed).  He then followed with his cardiologist Dr. Varner and 5/2020 noted left arm numbness and had a stress stress test which was abnormal and was sent for another CC and had stent placement in diagonal. He then follow up again with his cardiologist again in 8/31/20 and had another stress test despite being asymptomatic and did 10 METS on treadmill and no chest pain, SOB or other symptoms and the images showed a moderate inferior ischemic defect and mild apical ischemic defect. His cardiologist has recommended another CC and he is here to inquire about this and if he is going to be getting stents now on a regular basis.  He currently exercises regularly playing Tennis and exerts himself without issues and denies any chest pain, SOB, Dyspnea, neck or arm pain/numbness.  He reports being on Repatha in addition to his Crestor and his last blood work shows his Total cholesterol is 84 with and LDL of 7 and and HDL of 59. \par Note : Stress test report in computer 2016 - Inferior wall ischemia ([facility]).

## 2020-09-04 NOTE — PHYSICAL EXAM
[General Appearance - Well Developed] : well developed [General Appearance - Well Nourished] : well nourished [Normal Conjunctiva] : the conjunctiva exhibited no abnormalities [Eyelids - No Xanthelasma] : the eyelids demonstrated no xanthelasmas [Normal Oral Mucosa] : normal oral mucosa [Normal Oropharynx] : normal oropharynx [Normal Jugular Venous A Waves Present] : normal jugular venous A waves present [Normal Jugular Venous V Waves Present] : normal jugular venous V waves present [No Jugular Venous Young A Waves] : no jugular venous young A waves [Normal] : normal [5th Left ICS - MCL] : palpated at the 5th LICS in the midclavicular line [Rhythm Regular] : regular [Normal S1] : normal S1 [No Murmur] : no murmurs heard [Normal S2] : normal S2 [Right Carotid Bruit] : no bruit heard over the right carotid [Left Carotid Bruit] : no bruit heard over the left carotid [2+] : left 2+ [No Pitting Edema] : no pitting edema present [Auscultation Breath Sounds / Voice Sounds] : lungs were clear to auscultation bilaterally [Abdomen Soft] : soft [Bowel Sounds] : normal bowel sounds [Nail Clubbing] : no clubbing of the fingernails [Abnormal Walk] : normal gait [Cyanosis, Localized] : no localized cyanosis [] : no rash [No Anxiety] : not feeling anxious [Oriented To Time, Place, And Person] : oriented to person, place, and time

## 2020-09-04 NOTE — DISCUSSION/SUMMARY
[FreeTextEntry1] : We discussed the result of his stress test and that with no active symptoms and moderate risk stress test result that medical therapy for this at this time is what is recommended. There is no indication for another PCI in a patient without any rest or exertional symptoms/issues and who is able to complete 10 METS of exercise on the treadmill. The need at this point for further intervention should be dictated by symptoms. He is also not on any blood pressure medication and his BP here and at the time of his stress test was elevated. (>150 systolic).  Due to his bradycardia, BB are contraindicated but and ACE inhibitor would be a great option given the ability for it to protect against MI as well. His lipid control seems in slight excess as there is no further benefit in lowering LDL below 50's and either the Repatha or Crestor should be decreased as well. He will let me know if he wishes to follow up and proceed with these recommendations.

## 2020-09-05 ENCOUNTER — TRANSCRIPTION ENCOUNTER (OUTPATIENT)
Age: 70
End: 2020-09-05

## 2020-09-18 ENCOUNTER — APPOINTMENT (OUTPATIENT)
Dept: INTERNAL MEDICINE | Facility: CLINIC | Age: 70
End: 2020-09-18

## 2021-01-04 RX ORDER — ROSUVASTATIN CALCIUM 5 MG/1
1 TABLET ORAL
Qty: 0 | Refills: 0 | DISCHARGE

## 2021-01-04 RX ORDER — ASPIRIN/CALCIUM CARB/MAGNESIUM 324 MG
1 TABLET ORAL
Qty: 0 | Refills: 0 | DISCHARGE

## 2021-03-16 PROBLEM — E78.5 HYPERLIPIDEMIA, UNSPECIFIED: Chronic | Status: ACTIVE | Noted: 2019-08-28

## 2021-03-16 PROBLEM — I45.10 UNSPECIFIED RIGHT BUNDLE-BRANCH BLOCK: Chronic | Status: ACTIVE | Noted: 2019-08-28

## 2021-03-24 ENCOUNTER — APPOINTMENT (OUTPATIENT)
Dept: INTERNAL MEDICINE | Facility: CLINIC | Age: 71
End: 2021-03-24
Payer: MEDICARE

## 2021-03-24 VITALS — HEIGHT: 68 IN

## 2021-03-24 PROCEDURE — G0439: CPT

## 2021-03-24 PROCEDURE — G0444 DEPRESSION SCREEN ANNUAL: CPT | Mod: 59

## 2021-03-24 RX ORDER — ASPIRIN 325 MG/1
TABLET, FILM COATED ORAL DAILY
Refills: 0 | Status: ACTIVE | COMMUNITY

## 2021-03-24 RX ORDER — ROSUVASTATIN CALCIUM 10 MG/1
10 TABLET, FILM COATED ORAL
Refills: 0 | Status: DISCONTINUED | COMMUNITY
End: 2021-03-24

## 2021-03-25 VITALS
DIASTOLIC BLOOD PRESSURE: 80 MMHG | RESPIRATION RATE: 15 BRPM | BODY MASS INDEX: 29.65 KG/M2 | WEIGHT: 195 LBS | SYSTOLIC BLOOD PRESSURE: 138 MMHG | HEART RATE: 60 BPM | OXYGEN SATURATION: 98 % | TEMPERATURE: 98.2 F

## 2021-03-25 RX ORDER — INDOMETHACIN 25 MG/1
25 CAPSULE ORAL
Qty: 80 | Refills: 1 | Status: DISCONTINUED | COMMUNITY
Start: 2018-11-06 | End: 2021-03-25

## 2021-03-25 NOTE — ASSESSMENT
[FreeTextEntry1] : Physical examination shows a well-developed man in no acute distress blood pressure was 138/80 height 5 feet 8 inches weight 195 pounds BMI 29.65 temperature of 98.2 °F respiratory rate of 15 oxygen saturation on room air was 98% HEENT was unremarkable chest was clear cardiovascular exam was regular with no extra heart sounds or murmurs abdomen was soft and nontender extremities showed no clubbing cyanosis or edema and neurologic exam was nonfocal patient's blood pressure has responded nicely to the initiation of his irbesartan 150 mg daily patient received a slip for PSA has had complete blood test performed in early 2021 patient's recent EKG was done with his cardiologist who he follows regularly patient having a stress test in August 2020 he is up-to-date with his ophthalmologist and dermatologist his last colonoscopy was performed in April 2017 patient has received both doses of the Pfizer vaccine he is up-to-date with his influenza vaccine and his pneumovax 23 he needs the Prevnar 13 to consider the new shingles vaccine she has no recent episodes of gout

## 2021-03-25 NOTE — HEALTH RISK ASSESSMENT
[Yes] : Yes [No falls in past year] : Patient reported no falls in the past year [0] : 2) Feeling down, depressed, or hopeless: Not at all (0) [HIV test declined] : HIV test declined [Hepatitis C test declined] : Hepatitis C test declined [] : No [[id number]] : 0 [ColonoscopyDate] : 04/17

## 2021-03-25 NOTE — HISTORY OF PRESENT ILLNESS
[FreeTextEntry1] : 70-year-old man comes to the office for a comprehensive physical examination to review his medications and discuss his overall health he was recently started on medication for hypertension [de-identified] : Comes to the office for a comprehensive physical examination with a history of coronary artery disease status post coronary artery stents hyperlipidemia hypertension and gout denies temperature chills sweats or myalgias he has had no headache sinus congestion sore throat cough wheezing pleurisy chest pain shortness of breath exertional dyspnea lightheadedness palpitations dizziness vertigo or syncope he denies abdominal pain nausea vomiting diarrhea constipation bright red blood per rectum or black stools his appetite has been good his weight has been stable he denies significant musculoskeletal complaints he has had no leg edema skin rashes and usually sleeps well

## 2021-04-06 LAB
PSA FREE FLD-MCNC: 47 %
PSA FREE SERPL-MCNC: 0.18 NG/ML
PSA SERPL-MCNC: 0.39 NG/ML

## 2021-06-09 ENCOUNTER — APPOINTMENT (OUTPATIENT)
Dept: INTERNAL MEDICINE | Facility: CLINIC | Age: 71
End: 2021-06-09
Payer: MEDICARE

## 2021-06-09 VITALS
OXYGEN SATURATION: 96 % | WEIGHT: 205 LBS | HEART RATE: 51 BPM | BODY MASS INDEX: 31.07 KG/M2 | DIASTOLIC BLOOD PRESSURE: 70 MMHG | HEIGHT: 68 IN | SYSTOLIC BLOOD PRESSURE: 134 MMHG | RESPIRATION RATE: 16 BRPM | TEMPERATURE: 97.7 F

## 2021-06-09 DIAGNOSIS — N41.9 INFLAMMATORY DISEASE OF PROSTATE, UNSPECIFIED: ICD-10-CM

## 2021-06-09 PROCEDURE — 99215 OFFICE O/P EST HI 40 MIN: CPT

## 2021-06-09 RX ORDER — PRASUGREL 10 MG/1
10 TABLET, FILM COATED ORAL DAILY
Refills: 0 | Status: DISCONTINUED | COMMUNITY
End: 2021-06-09

## 2021-06-12 NOTE — ASSESSMENT
[FreeTextEntry1] : Physical exam shows a well-developed man in no acute distress blood pressure 134/70 height 5 foot 8 inches weight 205 pounds BMI 31.17 temperature 97.7 °F heart rate of 51 respiration 16 oxygen saturation on room air 96% HEENT was unremarkable chest was clear cardiovascular exam was regular with no extra heart sounds or murmurs abdomen was soft and nontender extremities showed no clubbing cyanosis or edema neurologic exam was nonfocal patient wishes to start antibiotics this evening the laboratory outpatient is closed Cipro 500 mg twice a day for 10 days was advised for presumed prostatitis patient had complete blood test in March 2021 he is up-to-date with his ophthalmologist and dermatologist he follows regularly with cardiologist having had a stress test Holter monitor in August 2020 she has received both doses of the Pfizer COVID-19 vaccine this years influenza vaccine the Prevnar 13 vaccine he has been advised to receive the new Shingrix vaccine and has had no episodes of gout in a long time

## 2021-06-12 NOTE — HISTORY OF PRESENT ILLNESS
[FreeTextEntry1] : 70-year-old man comes to the office for follow-up to review his medications and discuss his overall health recent issues with decreased frequency of urination and some mild burning during urination [de-identified] : Comes to the office for follow-up with a history of coronary artery disease gout hyperlipidemia coronary artery stent complaining of frequency of urination disturbance stream and dysuria he denies temperature chills sweats or myalgias he has had no headache sinus congestion sore throat cough wheezing pleurisy chest pain shortness of breath exertional dyspnea lightheadedness palpitations dizziness vertigo or syncope denies abdominal pain nausea vomiting diarrhea constipation bright red blood per rectum or black is appetite has been good his weight has been stable patient is complaining of urinary frequency and dysuria does get up at night to urinate chronically denies leg edema skin rashes and has been sleeping relatively well

## 2021-06-12 NOTE — REVIEW OF SYSTEMS
[Nocturia] : nocturia [Frequency] : frequency [Negative] : Heme/Lymph [Fever] : no fever [Chills] : no chills [Fatigue] : no fatigue [Hot Flashes] : no hot flashes [Night Sweats] : no night sweats [Recent Change In Weight] : ~T no recent weight change [Discharge] : no discharge [Pain] : no pain [Redness] : no redness [Dryness] : no dryness [Vision Problems] : no vision problems [Itching] : no itching [Earache] : no earache [Hearing Loss] : no hearing loss [Nosebleeds] : no nosebleeds [Postnasal Drip] : no postnasal drip [Nasal Discharge] : no nasal discharge [Sore Throat] : no sore throat [Hoarseness] : no hoarseness [Chest Pain] : no chest pain [Palpitations] : no palpitations [Claudication] : no  leg claudication [Lower Ext Edema] : no lower extremity edema [Orthopena] : no orthopnea [Paroxysmal Nocturnal Dyspnea] : no paroxysmal nocturnal dyspnea [Shortness Of Breath] : no shortness of breath [Wheezing] : no wheezing [Cough] : no cough [Dyspnea on Exertion] : not dyspnea on exertion [Abdominal Pain] : no abdominal pain [Nausea] : no nausea [Constipation] : no constipation [Diarrhea] : no diarrhea [Vomiting] : no vomiting [Heartburn] : no heartburn [Melena] : no melena [Dysuria] : no dysuria [Incontinence] : no incontinence [Hesitancy] : no hesitancy [Hematuria] : no hematuria [Impotence] : no impotency [Poor Libido] : libido not poor [Joint Pain] : no joint pain [Joint Stiffness] : no joint stiffness [Muscle Pain] : no muscle pain [Muscle Weakness] : no muscle weakness [Back Pain] : no back pain [Joint Swelling] : no joint swelling [Itching] : no itching [Mole Changes] : no mole changes [Nail Changes] : no nail changes [Hair Changes] : no hair changes [Skin Rash] : no skin rash [Dizziness] : no dizziness [Headache] : no headache [Fainting] : no fainting [Confusion] : no confusion [Unsteady Walk] : no ataxia [Memory Loss] : no memory loss [Suicidal] : not suicidal [Insomnia] : no insomnia [Anxiety] : no anxiety [Depression] : no depression [Easy Bleeding] : no easy bleeding [Easy Bruising] : no easy bruising [Swollen Glands] : no swollen glands

## 2021-06-12 NOTE — PHYSICAL EXAM
[No Acute Distress] : no acute distress [Well Nourished] : well nourished [Well Developed] : well developed [Well-Appearing] : well-appearing [Normal Voice/Communication] : normal voice/communication [Normal Sclera/Conjunctiva] : normal sclera/conjunctiva [PERRL] : pupils equal round and reactive to light [EOMI] : extraocular movements intact [Normal Outer Ear/Nose] : the outer ears and nose were normal in appearance [Normal Oropharynx] : the oropharynx was normal [Normal TMs] : both tympanic membranes were normal [Normal Nasal Mucosa] : the nasal mucosa was normal [No JVD] : no jugular venous distention [No Lymphadenopathy] : no lymphadenopathy [Supple] : supple [Thyroid Normal, No Nodules] : the thyroid was normal and there were no nodules present [No Respiratory Distress] : no respiratory distress  [No Accessory Muscle Use] : no accessory muscle use [Clear to Auscultation] : lungs were clear to auscultation bilaterally [Normal Percussion] : the chest was normal to percussion [Normal Rate] : normal rate  [Regular Rhythm] : with a regular rhythm [Normal S1, S2] : normal S1 and S2 [No Murmur] : no murmur heard [No Carotid Bruits] : no carotid bruits [No Abdominal Bruit] : a ~M bruit was not heard ~T in the abdomen [No Varicosities] : no varicosities [No Edema] : there was no peripheral edema [Pedal Pulses Present] : the pedal pulses are present [No Palpable Aorta] : no palpable aorta [No Extremity Clubbing/Cyanosis] : no extremity clubbing/cyanosis [Declined Breast Exam] : declined breast exam  [Soft] : abdomen soft [Non Tender] : non-tender [Non-distended] : non-distended [No Masses] : no abdominal mass palpated [No HSM] : no HSM [Normal Bowel Sounds] : normal bowel sounds [No Hernias] : no hernias [Declined Rectal Exam] : declined rectal exam [Normal Supraclavicular Nodes] : no supraclavicular lymphadenopathy [Normal Axillary Nodes] : no axillary lymphadenopathy [Normal Posterior Cervical Nodes] : no posterior cervical lymphadenopathy [Normal Anterior Cervical Nodes] : no anterior cervical lymphadenopathy [Normal Femoral Nodes] : no femoral lymphadenopathy [Normal Inguinal Nodes] : no inguinal lymphadenopathy [No CVA Tenderness] : no CVA  tenderness [No Spinal Tenderness] : no spinal tenderness [No Joint Swelling] : no joint swelling [Grossly Normal Strength/Tone] : grossly normal strength/tone [No Rash] : no rash [No Skin Lesions] : no skin lesions [Coordination Grossly Intact] : coordination grossly intact [No Focal Deficits] : no focal deficits [Normal Gait] : normal gait [Deep Tendon Reflexes (DTR)] : deep tendon reflexes were 2+ and symmetric [Speech Grossly Normal] : speech grossly normal [Memory Grossly Normal] : memory grossly normal [Normal Affect] : the affect was normal [Alert and Oriented x3] : oriented to person, place, and time [Normal Mood] : the mood was normal [Normal Insight/Judgement] : insight and judgment were intact [Kyphosis] : no kyphosis [Scoliosis] : no scoliosis [Acne] : no acne

## 2021-11-20 ENCOUNTER — TRANSCRIPTION ENCOUNTER (OUTPATIENT)
Age: 71
End: 2021-11-20

## 2022-06-20 ENCOUNTER — APPOINTMENT (OUTPATIENT)
Dept: INTERNAL MEDICINE | Facility: CLINIC | Age: 72
End: 2022-06-20

## 2022-06-20 VITALS
DIASTOLIC BLOOD PRESSURE: 70 MMHG | OXYGEN SATURATION: 97 % | BODY MASS INDEX: 30.31 KG/M2 | HEIGHT: 68 IN | SYSTOLIC BLOOD PRESSURE: 140 MMHG | HEART RATE: 50 BPM | WEIGHT: 200 LBS | TEMPERATURE: 97.6 F

## 2022-06-20 DIAGNOSIS — I25.10 ATHEROSCLEROTIC HEART DISEASE OF NATIVE CORONARY ARTERY W/OUT ANGINA PECTORIS: ICD-10-CM

## 2022-06-20 DIAGNOSIS — Z12.11 ENCOUNTER FOR SCREENING FOR MALIGNANT NEOPLASM OF COLON: ICD-10-CM

## 2022-06-20 PROCEDURE — 99215 OFFICE O/P EST HI 40 MIN: CPT

## 2022-06-20 RX ORDER — EVOLOCUMAB 140 MG/ML
140 INJECTION, SOLUTION SUBCUTANEOUS
Refills: 0 | Status: DISCONTINUED | COMMUNITY
End: 2022-06-20

## 2022-06-20 RX ORDER — EZETIMIBE 10 MG/1
10 TABLET ORAL DAILY
Refills: 0 | Status: DISCONTINUED | COMMUNITY
End: 2022-06-20

## 2022-06-20 RX ORDER — RIVAROXABAN 10 MG/1
10 TABLET, FILM COATED ORAL
Refills: 0 | Status: DISCONTINUED | COMMUNITY
Start: 2021-06-09 | End: 2022-06-20

## 2022-06-20 RX ORDER — IRBESARTAN 150 MG/1
150 TABLET ORAL
Qty: 90 | Refills: 1 | Status: DISCONTINUED | COMMUNITY
End: 2022-06-20

## 2022-07-03 ENCOUNTER — EMERGENCY (EMERGENCY)
Facility: HOSPITAL | Age: 72
LOS: 1 days | Discharge: HOME | End: 2022-07-03
Attending: STUDENT IN AN ORGANIZED HEALTH CARE EDUCATION/TRAINING PROGRAM | Admitting: EMERGENCY MEDICINE

## 2022-07-03 VITALS
SYSTOLIC BLOOD PRESSURE: 143 MMHG | HEIGHT: 67 IN | TEMPERATURE: 98 F | HEART RATE: 39 BPM | OXYGEN SATURATION: 98 % | RESPIRATION RATE: 15 BRPM | DIASTOLIC BLOOD PRESSURE: 67 MMHG

## 2022-07-03 VITALS
OXYGEN SATURATION: 98 % | RESPIRATION RATE: 18 BRPM | SYSTOLIC BLOOD PRESSURE: 148 MMHG | DIASTOLIC BLOOD PRESSURE: 77 MMHG | HEART RATE: 52 BPM

## 2022-07-03 DIAGNOSIS — Z98.890 OTHER SPECIFIED POSTPROCEDURAL STATES: Chronic | ICD-10-CM

## 2022-07-03 DIAGNOSIS — Z90.49 ACQUIRED ABSENCE OF OTHER SPECIFIED PARTS OF DIGESTIVE TRACT: Chronic | ICD-10-CM

## 2022-07-03 LAB
ALBUMIN SERPL ELPH-MCNC: 4 G/DL — SIGNIFICANT CHANGE UP (ref 3.3–5)
ALP SERPL-CCNC: 57 U/L — SIGNIFICANT CHANGE UP (ref 40–120)
ALT FLD-CCNC: 26 U/L — SIGNIFICANT CHANGE UP (ref 4–41)
ANION GAP SERPL CALC-SCNC: 10 MMOL/L — SIGNIFICANT CHANGE UP (ref 7–14)
AST SERPL-CCNC: 31 U/L — SIGNIFICANT CHANGE UP (ref 4–40)
BASOPHILS # BLD AUTO: 0.06 K/UL — SIGNIFICANT CHANGE UP (ref 0–0.2)
BASOPHILS NFR BLD AUTO: 0.7 % — SIGNIFICANT CHANGE UP (ref 0–2)
BILIRUB SERPL-MCNC: 2.1 MG/DL — HIGH (ref 0.2–1.2)
BUN SERPL-MCNC: 22 MG/DL — SIGNIFICANT CHANGE UP (ref 7–23)
CALCIUM SERPL-MCNC: 9.2 MG/DL — SIGNIFICANT CHANGE UP (ref 8.4–10.5)
CHLORIDE SERPL-SCNC: 102 MMOL/L — SIGNIFICANT CHANGE UP (ref 98–107)
CO2 SERPL-SCNC: 24 MMOL/L — SIGNIFICANT CHANGE UP (ref 22–31)
CREAT SERPL-MCNC: 1.22 MG/DL — SIGNIFICANT CHANGE UP (ref 0.5–1.3)
EGFR: 64 ML/MIN/1.73M2 — SIGNIFICANT CHANGE UP
EOSINOPHIL # BLD AUTO: 0.09 K/UL — SIGNIFICANT CHANGE UP (ref 0–0.5)
EOSINOPHIL NFR BLD AUTO: 1.1 % — SIGNIFICANT CHANGE UP (ref 0–6)
FLUAV AG NPH QL: SIGNIFICANT CHANGE UP
FLUBV AG NPH QL: SIGNIFICANT CHANGE UP
GLUCOSE SERPL-MCNC: 93 MG/DL — SIGNIFICANT CHANGE UP (ref 70–99)
HCT VFR BLD CALC: 48.3 % — SIGNIFICANT CHANGE UP (ref 39–50)
HGB BLD-MCNC: 15.2 G/DL — SIGNIFICANT CHANGE UP (ref 13–17)
IANC: 6.04 K/UL — SIGNIFICANT CHANGE UP (ref 1.8–7.4)
IMM GRANULOCYTES NFR BLD AUTO: 0.2 % — SIGNIFICANT CHANGE UP (ref 0–1.5)
LYMPHOCYTES # BLD AUTO: 0.99 K/UL — LOW (ref 1–3.3)
LYMPHOCYTES # BLD AUTO: 11.9 % — LOW (ref 13–44)
MCHC RBC-ENTMCNC: 28.8 PG — SIGNIFICANT CHANGE UP (ref 27–34)
MCHC RBC-ENTMCNC: 31.5 GM/DL — LOW (ref 32–36)
MCV RBC AUTO: 91.7 FL — SIGNIFICANT CHANGE UP (ref 80–100)
MONOCYTES # BLD AUTO: 1.12 K/UL — HIGH (ref 0–0.9)
MONOCYTES NFR BLD AUTO: 13.5 % — SIGNIFICANT CHANGE UP (ref 2–14)
NEUTROPHILS # BLD AUTO: 6.04 K/UL — SIGNIFICANT CHANGE UP (ref 1.8–7.4)
NEUTROPHILS NFR BLD AUTO: 72.6 % — SIGNIFICANT CHANGE UP (ref 43–77)
NRBC # BLD: 0 /100 WBCS — SIGNIFICANT CHANGE UP
NRBC # FLD: 0 K/UL — SIGNIFICANT CHANGE UP
NT-PROBNP SERPL-SCNC: 708 PG/ML — HIGH
PLATELET # BLD AUTO: 145 K/UL — LOW (ref 150–400)
POTASSIUM SERPL-MCNC: 5 MMOL/L — SIGNIFICANT CHANGE UP (ref 3.5–5.3)
POTASSIUM SERPL-SCNC: 5 MMOL/L — SIGNIFICANT CHANGE UP (ref 3.5–5.3)
PROT SERPL-MCNC: 7.2 G/DL — SIGNIFICANT CHANGE UP (ref 6–8.3)
RBC # BLD: 5.27 M/UL — SIGNIFICANT CHANGE UP (ref 4.2–5.8)
RBC # FLD: 12.8 % — SIGNIFICANT CHANGE UP (ref 10.3–14.5)
RSV RNA NPH QL NAA+NON-PROBE: SIGNIFICANT CHANGE UP
SARS-COV-2 RNA SPEC QL NAA+PROBE: SIGNIFICANT CHANGE UP
SODIUM SERPL-SCNC: 136 MMOL/L — SIGNIFICANT CHANGE UP (ref 135–145)
TROPONIN T, HIGH SENSITIVITY RESULT: 22 NG/L — SIGNIFICANT CHANGE UP
TROPONIN T, HIGH SENSITIVITY RESULT: 24 NG/L — SIGNIFICANT CHANGE UP
WBC # BLD: 8.32 K/UL — SIGNIFICANT CHANGE UP (ref 3.8–10.5)
WBC # FLD AUTO: 8.32 K/UL — SIGNIFICANT CHANGE UP (ref 3.8–10.5)

## 2022-07-03 PROCEDURE — 71045 X-RAY EXAM CHEST 1 VIEW: CPT | Mod: 26

## 2022-07-03 PROCEDURE — 93010 ELECTROCARDIOGRAM REPORT: CPT | Mod: GC

## 2022-07-03 PROCEDURE — 99285 EMERGENCY DEPT VISIT HI MDM: CPT | Mod: CS,25,GC

## 2022-07-03 PROCEDURE — 71275 CT ANGIOGRAPHY CHEST: CPT | Mod: 26,MA

## 2022-07-03 RX ADMIN — Medication 100 MILLIGRAM(S): at 17:09

## 2022-07-03 NOTE — ED PROVIDER NOTE - NS ED ROS FT
General: denies fever, chills  HENT: denies nasal congestion, rhinorrhea  Eyes: denies visual changes, blurred vision  CV: + chest pain  Resp:+ SOB and cough  Abdominal: denies nausea, vomiting, diarrhea, abdominal pain  : denies urinary pain or discharge  MSK: denies muscle aches, leg swelling  Neuro: denies headaches, numbness, tingling  Skin: denies rashes, bruises

## 2022-07-03 NOTE — ED PROVIDER NOTE - PHYSICAL EXAMINATION
GENERAL: well appearing in no acute distress, non-toxic appearing  HEAD: normocephalic, atraumatic  HENT: airway intact, neck supple  EYES: normal conjunctiva, eomi, perrl  CARDIAC: sinus bradycardia (baseline 40s-50s); normal S1S2, no appreciable murmurs, 2+ pulses in UE/LE b/l  PULM: + increased WOB; normal breath sounds, clear to ascultation bilaterally, no rales, rhonchi, wheezing  GI: abdomen nondistended, soft, nontender, no guarding, rebound tenderness  NEURO: no focal motor or sensory deficits  MSK: no peripheral edema, no calf tenderness b/l  SKIN: well-perfused, extremities warm, no visible rashes

## 2022-07-03 NOTE — ED PROVIDER NOTE - CLINICAL SUMMARY MEDICAL DECISION MAKING FREE TEXT BOX
72yo M w/ history of CAD s/p 3 stents (most recent in May 2020) and HTN (on metoprolol) coming in w/ pleuritic chest pain and tachypnea; concerns for pleurisy vs PE vs myocarditis vs pericarditis vs ACS (although ekg w/o any ST elevations or depressions)

## 2022-07-03 NOTE — ED ADULT TRIAGE NOTE - PAIN: PRESENCE, MLM
complains of pain/discomfort
Pupils equal, round and reactive to light, Extra-ocular movement intact, eyes are clear b/l

## 2022-07-03 NOTE — ED PROVIDER NOTE - NSFOLLOWUPINSTRUCTIONS_ED_ALL_ED_FT
Discharge instructions:    - Please follow up with your Cardiologist within the next 24-72 hours.    - Take any prescribed medications as instructed:         SEEK IMMEDIATE MEDICAL CARE IF YOU HAVE ANY OF THE FOLLOWING SYMPTOMS: worsening chest pain, coughing up blood, unexplained back/neck/jaw pain, severe abdominal pain, dizziness or lightheadedness, fainting, shortness of breath, sweaty or clammy skin, vomiting, or racing heart beat. These symptoms may represent a serious problem that is an emergency. Do not wait to see if the symptoms will go away. Get medical help right away. Call 911 and do not drive yourself to the hospital.

## 2022-07-03 NOTE — ED ADULT TRIAGE NOTE - CHIEF COMPLAINT QUOTE
Pt arrives ambulatory to triage c/o CP upon inspiration and a dry cough since yesterday. Pt states "I think I have pneumonia." Bradycardia noted in triage, charge RN made aware. PMH: CAD with 3 stents (takes ASA), HLD.

## 2022-07-03 NOTE — ED PROVIDER NOTE - PROGRESS NOTE DETAILS
Guillaume, PGY3: Patient reassessed and notes improvement in symptoms. Discussed w/ patient unactionable results; delta trop negative, CTA negative for PE and although bnp mildly elevated, no pleural effusion noted and clinically no signs of fluid overloaded state. Offered patient option for CDU for an echo, but patient informs me that he last had an echocardiogram and angiogram in November and both exams were within normal limits. Patient originally had outpatient Cardiology follow up on 7/12 but can see the cardiologist on Tuesday, 7/5. Discussed w/ patient pros/cons and via shared decision making, patient understands strict return precautions and feels comfortable being discharged.

## 2022-07-03 NOTE — ED ADULT NURSE NOTE - OBJECTIVE STATEMENT
Receive pt in spot 10 alert and oriented x 4 c/o chest pain, worse on Inhalation, dry cough, and SOB.  Denies dizziness, chills, N.V .   Resp  unllabored. Pt sinus pardeep on cardiac monitoring.  IV 20 G LAC placed. Labs sent

## 2022-07-03 NOTE — ED PROVIDER NOTE - OBJECTIVE STATEMENT
72yo M w/ history of CAD s/p 3 stents (most recent in May 2020) and HTN (on metoprolol) coming in w/ sharp chest pain that is exacerbated w/ inhalation. Symptoms started yesterday and are associated w/ dry cough. Notes SOB as well described as "difficult to get full breath in". Symptoms started suddenly yesterday and has never had anything prior. Typically exercises several times a week and plays tennis >2 hours.

## 2022-07-03 NOTE — ED PROVIDER NOTE - ATTENDING CONTRIBUTION TO CARE
70 yo male with PMH CAD s/p 3 stents,  HTN for evaluation of chest pain, pleuritic, tachypneic with dry cough. PE: Well appearing, RRR, CTA BL lungs, tachypneic, abd soft NTND   A/P Labs, imaging, medicate, reassess

## 2022-07-03 NOTE — ED PROVIDER NOTE - PATIENT PORTAL LINK FT
You can access the FollowMyHealth Patient Portal offered by [facility] by registering at the following website: [email or web address]. By joining [facility]’s FollowMyHealth portal, you will also be able to view your health information using other applications (apps) compatible with our system.

## 2022-07-08 PROBLEM — Z12.11 ENCOUNTER FOR SCREENING COLONOSCOPY: Status: RESOLVED | Noted: 2022-07-08 | Resolved: 2022-07-22

## 2022-07-08 NOTE — REVIEW OF SYSTEMS
[Nocturia] : nocturia [Frequency] : frequency [Negative] : Heme/Lymph [Fever] : no fever [Chills] : no chills [Fatigue] : no fatigue [Hot Flashes] : no hot flashes [Night Sweats] : no night sweats [Recent Change In Weight] : ~T no recent weight change [Discharge] : no discharge [Pain] : no pain [Redness] : no redness [Dryness] : no dryness [Vision Problems] : no vision problems [Itching] : no itching [Earache] : no earache [Hearing Loss] : no hearing loss [Nosebleeds] : no nosebleeds [Postnasal Drip] : no postnasal drip [Nasal Discharge] : no nasal discharge [Sore Throat] : no sore throat [Hoarseness] : no hoarseness [Chest Pain] : no chest pain [Palpitations] : no palpitations [Claudication] : no  leg claudication [Lower Ext Edema] : no lower extremity edema [Orthopena] : no orthopnea [Paroxysmal Nocturnal Dyspnea] : no paroxysmal nocturnal dyspnea [Shortness Of Breath] : no shortness of breath [Wheezing] : no wheezing [Cough] : no cough [Dyspnea on Exertion] : not dyspnea on exertion [Abdominal Pain] : no abdominal pain [Nausea] : no nausea [Constipation] : no constipation [Diarrhea] : no diarrhea [Vomiting] : no vomiting [Heartburn] : no heartburn [Melena] : no melena [Dysuria] : no dysuria [Incontinence] : no incontinence [Hesitancy] : no hesitancy [Hematuria] : no hematuria [Impotence] : no impotency [Poor Libido] : libido not poor [Joint Pain] : no joint pain [Joint Stiffness] : no joint stiffness [Muscle Pain] : no muscle pain [Muscle Weakness] : no muscle weakness [Back Pain] : no back pain [Joint Swelling] : no joint swelling [Itching] : no itching [Mole Changes] : no mole changes [Nail Changes] : no nail changes [Hair Changes] : no hair changes [Skin Rash] : no skin rash [Headache] : no headache [Dizziness] : no dizziness [Fainting] : no fainting [Confusion] : no confusion [Unsteady Walk] : no ataxia [Memory Loss] : no memory loss [Suicidal] : not suicidal [Insomnia] : no insomnia [Anxiety] : no anxiety [Depression] : no depression [Easy Bleeding] : no easy bleeding [Easy Bruising] : no easy bruising [Swollen Glands] : no swollen glands

## 2022-07-08 NOTE — HEALTH RISK ASSESSMENT
[Never] : Never [Yes] : Yes [No falls in past year] : Patient reported no falls in the past year [0] : 2) Feeling down, depressed, or hopeless: Not at all (0) [PHQ-2 Negative - No further assessment needed] : PHQ-2 Negative - No further assessment needed [de-identified] : social [[id number]] : 0

## 2022-07-08 NOTE — HISTORY OF PRESENT ILLNESS
[FreeTextEntry1] : 72-year-old man comes to the office for follow-up to review his medications and discuss his overall health patient also wishes to schedule a screening colonoscopy chief complaint lately has been urinary frequency [de-identified] : Comes to the office for follow-up with a history of coronary artery disease status post stent hyperlipidemia elevated blood pressure gout patient denies temperature chills sweats or myalgias he has had no headaches sinus congestion sore throat cough wheezing pleurisy chest pain shortness of breath exertional dyspnea lightheadedness palpitations dizziness vertigo or syncope patient denies abdominal pain nausea vomiting diarrhea constipation bright red blood per rectum or black stools appetite has been good weight has been stable patient does get up at night to urinate and urinates frequently but denies dysuria or gross hematuria said no significant musculoskeletal complaints at present he has had no episodes of anxiety or depression and has been sleeping adequately recently he denies leg edema

## 2022-07-08 NOTE — PLAN
[FreeTextEntry1] : Physical examination shows a well-developed man in no acute distress blood pressure 140/70 height 5 feet 8 inches weight 200 pounds BMI 30.41 temperature 97.6 °F heart rate of 50 respiratory rate of 16 oxygen saturation on room air was 97% HEENT was unremarkable chest was clear cardiovascular exam was regular with no extra heart sounds or murmurs abdomen was soft extremities showed no edema neurologic exam was nonfocal slip was given for complete blood test including CBC full chemistries lipid profile thyroid profile urine analysis CRP hemoglobin A1c vitamins D3 and B12 COVID-19 nuclear capsid and spike domain antibodies HIV was also requested as well as PSA colonoscopy was scheduled a MiraLAX Gatorade preparation was reviewed with the patient and all his questions were answered he is aware of the need to have a ride home from the procedure and that he must stop all blood thinning medications herbs and vitamins 7 days before he is also aware that he cannot drive an automobile for 24 hours after the procedure the risk of bleeding and perforation were reviewed and he is aware of these risks patient was asked to follow-up with his cardiologist and to have his cardiologist forward us copies of his most recent echocardiogram and EKG and stress test he is up-to-date with his ophthalmologist he will consider a dermatology visit for cancer screening patient's blood pressure today was 140/70 he is aware that his blood pressure has been labile he continues on his Entresto he has had no episodes of gout recently .He plans on seeing a urologist for his urinary frequency

## 2022-07-14 ENCOUNTER — APPOINTMENT (OUTPATIENT)
Dept: INTERNAL MEDICINE | Facility: CLINIC | Age: 72
End: 2022-07-14

## 2022-07-14 VITALS
WEIGHT: 200 LBS | BODY MASS INDEX: 30.31 KG/M2 | HEIGHT: 68 IN | SYSTOLIC BLOOD PRESSURE: 110 MMHG | RESPIRATION RATE: 16 BRPM | DIASTOLIC BLOOD PRESSURE: 70 MMHG | OXYGEN SATURATION: 96 % | HEART RATE: 67 BPM | TEMPERATURE: 97.8 F

## 2022-07-14 DIAGNOSIS — R03.0 ELEVATED BLOOD-PRESSURE READING, W/OUT DIAGNOSIS OF HYPERTENSION: ICD-10-CM

## 2022-07-14 DIAGNOSIS — M10.9 GOUT, UNSPECIFIED: ICD-10-CM

## 2022-07-14 DIAGNOSIS — Z95.5 PRESENCE OF CORONARY ANGIOPLASTY IMPLANT AND GRAFT: ICD-10-CM

## 2022-07-14 DIAGNOSIS — E78.5 HYPERLIPIDEMIA, UNSPECIFIED: ICD-10-CM

## 2022-07-14 DIAGNOSIS — I25.10 ATHEROSCLEROTIC HEART DISEASE OF NATIVE CORONARY ARTERY W/OUT ANGINA PECTORIS: ICD-10-CM

## 2022-07-14 DIAGNOSIS — Z00.00 ENCOUNTER FOR GENERAL ADULT MEDICAL EXAMINATION W/OUT ABNORMAL FINDINGS: ICD-10-CM

## 2022-07-14 PROCEDURE — G0439: CPT

## 2022-07-14 RX ORDER — IBUPROFEN 600 MG/1
600 TABLET, FILM COATED ORAL
Qty: 28 | Refills: 0 | Status: ACTIVE | COMMUNITY
Start: 2022-04-25

## 2022-07-14 RX ORDER — SACUBITRIL AND VALSARTAN 24; 26 MG/1; MG/1
24-26 TABLET, FILM COATED ORAL
Refills: 0 | Status: DISCONTINUED | COMMUNITY
Start: 2021-06-09 | End: 2022-07-14

## 2022-07-14 RX ORDER — AMOXICILLIN 500 MG/1
500 CAPSULE ORAL
Qty: 21 | Refills: 0 | Status: COMPLETED | COMMUNITY
Start: 2022-04-25

## 2022-07-15 ENCOUNTER — TRANSCRIPTION ENCOUNTER (OUTPATIENT)
Age: 72
End: 2022-07-15

## 2022-07-19 PROBLEM — Z95.5 S/P CORONARY ARTERY STENT PLACEMENT: Status: RESOLVED | Noted: 2020-02-04 | Resolved: 2020-09-04

## 2022-07-19 PROBLEM — Z95.5 S/P CORONARY ARTERY STENT PLACEMENT: Status: ACTIVE | Noted: 2022-07-19

## 2022-07-19 NOTE — HISTORY OF PRESENT ILLNESS
[FreeTextEntry1] : 72-year-old man comes to the office for a comprehensive physical examination to review his medications and discuss his overall health recent issues with frequent urination [de-identified] : Comes to the office for a comprehensive physical examination with a history of coronary artery disease status post stent hyperlipidemia borderline blood pressure gout patient denies temperature chills sweats or myalgias has had no headache sinus congestion sore throat cough wheezing pleurisy chest pain shortness of breath exertional dyspnea lightheadedness palpitations dizziness vertigo or syncope denies abdominal pain nausea vomiting diarrhea constipation bright red blood per rectum or black stools appetite has been good weight has been stable denies severe muscular pains urinates frequently and does get up at night to urinate but denies dysuria or gross hematuria has had no leg edema skin rashes anxiety and depression and has been sleeping adequately recently

## 2022-07-19 NOTE — HEALTH RISK ASSESSMENT
[Never] : Never [Yes] : Yes [No falls in past year] : Patient reported no falls in the past year [0] : 2) Feeling down, depressed, or hopeless: Not at all (0) [PHQ-2 Negative - No further assessment needed] : PHQ-2 Negative - No further assessment needed [de-identified] : social [[id number]] : 0 [HIV test declined] : HIV test declined [ColonoscopyDate] : 04/17

## 2022-07-19 NOTE — ASSESSMENT
[FreeTextEntry1] : Physical examination shows a well-developed man in no acute distress blood pressure was 110/70 height 5 foot 8 inches weight 200 pounds BMI 30.41 temperature 97.8 °F heart rate of 67 respiratory rate of 16 oxygen saturation on room air was 96% HEENT was unremarkable chest was clear cardiovascular exam was regular with no extra heart sounds or murmurs abdomen was soft and nontender extremities showed no clubbing cyanosis or edema neurologic exam was nonfocal patient followed closely by cardiology had a cardiac catheterization in November 2021 with no acute problems medical management was advised he has received 2 COVID-19 vaccines and will consider the Prevnar 20 the influenza vaccine and the Shingrix vaccine he is up-to-date with his ophthalmologist he will consider a dermatology visit for cancer screening he was given a slip for complete blood test including CBC full chemistries lipid profile thyroid profile urinalysis CRP vitamins D3 and B12 hemoglobin A1c COVID-19 nuclear capsid and spike domain antibodies and PSA his last colonoscopy was performed in April 2017 making him due for a follow-up exam patient's blood pressure was normal at the present visit had no episodes of gout recently

## 2022-07-22 ENCOUNTER — APPOINTMENT (OUTPATIENT)
Dept: ORTHOPEDIC SURGERY | Facility: CLINIC | Age: 72
End: 2022-07-22

## 2022-07-22 VITALS
HEIGHT: 68 IN | DIASTOLIC BLOOD PRESSURE: 84 MMHG | WEIGHT: 200 LBS | BODY MASS INDEX: 30.31 KG/M2 | SYSTOLIC BLOOD PRESSURE: 168 MMHG

## 2022-07-22 DIAGNOSIS — S86.812A STRAIN OF OTHER MUSCLE(S) AND TENDON(S) AT LOWER LEG LEVEL, LEFT LEG, INITIAL ENCOUNTER: ICD-10-CM

## 2022-07-22 PROCEDURE — 99203 OFFICE O/P NEW LOW 30 MIN: CPT

## 2022-07-22 NOTE — HISTORY OF PRESENT ILLNESS
[de-identified] : This 72-year-old male presents for evaluation of an injury to his left leg.  He was playing [place] on the beach 10 days ago.  He had pain the following day.  He does not have that much pain when he is walking but is having difficulty playing tennis.  He has no prior history of left leg pain or injury.

## 2022-07-22 NOTE — PHYSICAL EXAM
[LE] : Sensory: Intact in bilateral lower extremities [DP] : dorsalis pedis 2+ and symmetric bilaterally [PT] : posterior tibial 2+ and symmetric bilaterally [Normal] : Alert and in no acute distress [Poor Appearance] : well-appearing [Acute Distress] : not in acute distress [Obese] : not obese [de-identified] : The patient has no respiratory distress. Mood and affect are normal. The patient is alert and oriented to person, place and time.\par There is no pain with active or passive motion of the hips.  There is no pain with knee range of motion.  There is mild tenderness of the left calf at the junction between the triceps muscles and the Achilles tendon.  The Achilles tendon is nontender.  Atwood test is normal.  Both ankles are stable.  There is no tenderness of midfoot or forefoot.  The skin is intact.  There is no lymphedema.

## 2022-07-22 NOTE — DISCUSSION/SUMMARY
[de-identified] : The patient has a sprain to the left calf.  The Achilles tendon is intact.  I have discussed the pathology and natural history with him.  I have offered him a course of physical therapy.  He would like to try a home stretching program initially.  He is given instruction sheet on directions.  If symptomatic in 1 month he will return.

## 2022-07-25 ENCOUNTER — LABORATORY RESULT (OUTPATIENT)
Age: 72
End: 2022-07-25

## 2022-07-25 ENCOUNTER — OUTPATIENT (OUTPATIENT)
Dept: OUTPATIENT SERVICES | Facility: HOSPITAL | Age: 72
LOS: 1 days | End: 2022-07-25
Payer: MEDICARE

## 2022-07-25 DIAGNOSIS — Z90.49 ACQUIRED ABSENCE OF OTHER SPECIFIED PARTS OF DIGESTIVE TRACT: Chronic | ICD-10-CM

## 2022-07-25 DIAGNOSIS — Z98.890 OTHER SPECIFIED POSTPROCEDURAL STATES: Chronic | ICD-10-CM

## 2022-07-25 DIAGNOSIS — Z20.828 CONTACT WITH AND (SUSPECTED) EXPOSURE TO OTHER VIRAL COMMUNICABLE DISEASES: ICD-10-CM

## 2022-07-25 DIAGNOSIS — D58.2 OTHER HEMOGLOBINOPATHIES: ICD-10-CM

## 2022-07-25 PROCEDURE — U0005: CPT

## 2022-07-25 PROCEDURE — U0003: CPT

## 2022-07-27 ENCOUNTER — OUTPATIENT (OUTPATIENT)
Dept: OUTPATIENT SERVICES | Facility: HOSPITAL | Age: 72
LOS: 1 days | Discharge: HOME | End: 2022-07-27

## 2022-07-27 ENCOUNTER — APPOINTMENT (OUTPATIENT)
Dept: INTERNAL MEDICINE | Facility: HOSPITAL | Age: 72
End: 2022-07-27

## 2022-07-27 DIAGNOSIS — R97.0 ELEVATED CARCINOEMBRYONIC ANTIGEN [CEA]: ICD-10-CM

## 2022-07-27 DIAGNOSIS — Z98.890 OTHER SPECIFIED POSTPROCEDURAL STATES: Chronic | ICD-10-CM

## 2022-07-27 DIAGNOSIS — Z90.49 ACQUIRED ABSENCE OF OTHER SPECIFIED PARTS OF DIGESTIVE TRACT: Chronic | ICD-10-CM

## 2022-07-27 LAB
25(OH)D3 SERPL-MCNC: 40 NG/ML
ALBUMIN SERPL ELPH-MCNC: 4.7 G/DL
ALP BLD-CCNC: 78 U/L
ALT SERPL-CCNC: 59 U/L
ANION GAP SERPL CALC-SCNC: 12 MMOL/L
APPEARANCE: CLEAR
AST SERPL-CCNC: 74 U/L
BASOPHILS # BLD AUTO: 0.07 K/UL
BASOPHILS # BLD AUTO: 0.1 K/UL
BASOPHILS NFR BLD AUTO: 0.6 %
BASOPHILS NFR BLD AUTO: 0.9 %
BILIRUB SERPL-MCNC: 2.2 MG/DL
BILIRUBIN URINE: NEGATIVE
BLOOD URINE: NEGATIVE
BUN SERPL-MCNC: 18 MG/DL
CALCIUM SERPL-MCNC: 9.9 MG/DL
CHLORIDE SERPL-SCNC: 100 MMOL/L
CHOLEST SERPL-MCNC: 203 MG/DL
CO2 SERPL-SCNC: 26 MMOL/L
COLOR: YELLOW
COVID-19 NUCLEOCAPSID  GAM ANTIBODY INTERPRETATION: NEGATIVE
COVID-19 SPIKE DOMAIN ANTIBODY INTERPRETATION: POSITIVE
CREAT SERPL-MCNC: 1.43 MG/DL
CRP SERPL HS-MCNC: 91.97 MG/L
EGFR: 52 ML/MIN/1.73M2
EOSINOPHIL # BLD AUTO: 0.07 K/UL
EOSINOPHIL # BLD AUTO: 0.1 K/UL
EOSINOPHIL NFR BLD AUTO: 0.6 %
EOSINOPHIL NFR BLD AUTO: 0.9 %
ESTIMATED AVERAGE GLUCOSE: 120 MG/DL
GLUCOSE BS SERPL-MCNC: 96 MG/DL
GLUCOSE QUALITATIVE U: NEGATIVE
GLUCOSE SERPL-MCNC: 103 MG/DL
HBA1C MFR BLD HPLC: 5.8 %
HCT VFR BLD CALC: 56.6 %
HCT VFR BLD CALC: 56.9 %
HDLC SERPL-MCNC: 62 MG/DL
HGB BLD-MCNC: 18.2 G/DL
HGB BLD-MCNC: 19 G/DL
IMM GRANULOCYTES NFR BLD AUTO: 0.2 %
IMM GRANULOCYTES NFR BLD AUTO: 0.3 %
KETONES URINE: NEGATIVE
LDLC SERPL CALC-MCNC: 112 MG/DL
LEUKOCYTE ESTERASE URINE: NEGATIVE
LYMPHOCYTES # BLD AUTO: 1.12 K/UL
LYMPHOCYTES # BLD AUTO: 1.47 K/UL
LYMPHOCYTES NFR BLD AUTO: 12.8 %
LYMPHOCYTES NFR BLD AUTO: 9.9 %
MAN DIFF?: NORMAL
MAN DIFF?: NORMAL
MCHC RBC-ENTMCNC: 28.6 PG
MCHC RBC-ENTMCNC: 29.4 PG
MCHC RBC-ENTMCNC: 32 GM/DL
MCHC RBC-ENTMCNC: 33.6 GM/DL
MCV RBC AUTO: 87.6 FL
MCV RBC AUTO: 89.3 FL
MONOCYTES # BLD AUTO: 1.12 K/UL
MONOCYTES # BLD AUTO: 1.14 K/UL
MONOCYTES NFR BLD AUTO: 10.1 %
MONOCYTES NFR BLD AUTO: 9.7 %
NEUTROPHILS # BLD AUTO: 8.73 K/UL
NEUTROPHILS # BLD AUTO: 8.84 K/UL
NEUTROPHILS NFR BLD AUTO: 76 %
NEUTROPHILS NFR BLD AUTO: 78 %
NITRITE URINE: NEGATIVE
NONHDLC SERPL-MCNC: 141 MG/DL
PH URINE: 6.5
PLATELET # BLD AUTO: 158 K/UL
PLATELET # BLD AUTO: 166 K/UL
POTASSIUM SERPL-SCNC: 4.7 MMOL/L
PROT SERPL-MCNC: 8 G/DL
PROTEIN URINE: ABNORMAL
PSA FREE FLD-MCNC: 41 %
PSA FREE SERPL-MCNC: 0.19 NG/ML
PSA SERPL-MCNC: 0.45 NG/ML
RBC # BLD: 6.37 M/UL
RBC # BLD: 6.46 M/UL
RBC # FLD: 12.8 %
RBC # FLD: 13.1 %
SARS-COV-2 AB SERPL IA-ACNC: >250 U/ML
SARS-COV-2 AB SERPL QL IA: 0.07 INDEX
SARS-COV-2 N GENE NPH QL NAA+PROBE: NOT DETECTED
SODIUM SERPL-SCNC: 139 MMOL/L
SPECIFIC GRAVITY URINE: 1.02
T4 FREE SERPL-MCNC: 1.1 NG/DL
TRIGL SERPL-MCNC: 144 MG/DL
TSH SERPL-ACNC: 3.45 UIU/ML
UROBILINOGEN URINE: NORMAL
VIT B12 SERPL-MCNC: 461 PG/ML
WBC # FLD AUTO: 11.32 K/UL
WBC # FLD AUTO: 11.49 K/UL

## 2022-07-28 ENCOUNTER — APPOINTMENT (OUTPATIENT)
Dept: HEMATOLOGY ONCOLOGY | Facility: CLINIC | Age: 72
End: 2022-07-28

## 2022-07-28 ENCOUNTER — RESULT REVIEW (OUTPATIENT)
Age: 72
End: 2022-07-28

## 2022-07-28 ENCOUNTER — LABORATORY RESULT (OUTPATIENT)
Age: 72
End: 2022-07-28

## 2022-07-28 VITALS
SYSTOLIC BLOOD PRESSURE: 157 MMHG | HEIGHT: 66.93 IN | TEMPERATURE: 97.3 F | BODY MASS INDEX: 30.87 KG/M2 | DIASTOLIC BLOOD PRESSURE: 91 MMHG | OXYGEN SATURATION: 97 % | HEART RATE: 87 BPM | RESPIRATION RATE: 16 BRPM | WEIGHT: 196.65 LBS

## 2022-07-28 LAB
BASOPHILS # BLD AUTO: 0.07 K/UL — SIGNIFICANT CHANGE UP (ref 0–0.2)
BASOPHILS NFR BLD AUTO: 1 % — SIGNIFICANT CHANGE UP (ref 0–2)
EOSINOPHIL # BLD AUTO: 0.21 K/UL — SIGNIFICANT CHANGE UP (ref 0–0.5)
EOSINOPHIL NFR BLD AUTO: 3.1 % — SIGNIFICANT CHANGE UP (ref 0–6)
HCT VFR BLD CALC: 51.3 % — HIGH (ref 39–50)
HGB BLD-MCNC: 17 G/DL — SIGNIFICANT CHANGE UP (ref 13–17)
IMM GRANULOCYTES NFR BLD AUTO: 0.3 % — SIGNIFICANT CHANGE UP (ref 0–1.5)
LYMPHOCYTES # BLD AUTO: 1.12 K/UL — SIGNIFICANT CHANGE UP (ref 1–3.3)
LYMPHOCYTES # BLD AUTO: 16.4 % — SIGNIFICANT CHANGE UP (ref 13–44)
MCHC RBC-ENTMCNC: 29.4 PG — SIGNIFICANT CHANGE UP (ref 27–34)
MCHC RBC-ENTMCNC: 33.1 G/DL — SIGNIFICANT CHANGE UP (ref 32–36)
MCV RBC AUTO: 88.6 FL — SIGNIFICANT CHANGE UP (ref 80–100)
MONOCYTES # BLD AUTO: 0.91 K/UL — HIGH (ref 0–0.9)
MONOCYTES NFR BLD AUTO: 13.4 % — SIGNIFICANT CHANGE UP (ref 2–14)
NEUTROPHILS # BLD AUTO: 4.48 K/UL — SIGNIFICANT CHANGE UP (ref 1.8–7.4)
NEUTROPHILS NFR BLD AUTO: 65.8 % — SIGNIFICANT CHANGE UP (ref 43–77)
NRBC # BLD: 0 /100 WBCS — SIGNIFICANT CHANGE UP (ref 0–0)
PLATELET # BLD AUTO: 152 K/UL — SIGNIFICANT CHANGE UP (ref 150–400)
RBC # BLD: 5.79 M/UL — SIGNIFICANT CHANGE UP (ref 4.2–5.8)
RBC # FLD: 12.6 % — SIGNIFICANT CHANGE UP (ref 10.3–14.5)
WBC # BLD: 6.81 K/UL — SIGNIFICANT CHANGE UP (ref 3.8–10.5)
WBC # FLD AUTO: 6.81 K/UL — SIGNIFICANT CHANGE UP (ref 3.8–10.5)

## 2022-07-28 PROCEDURE — 99205 OFFICE O/P NEW HI 60 MIN: CPT

## 2022-07-28 NOTE — ASSESSMENT
[FreeTextEntry1] : 73 yo gentleman with PMHx of CAD s/p 3 stents, HLD, HTN, referred for evaluation of erythrocytosis.\par \par #Erythrocytosis: \par Patient may have had a degree of hemoconcentration due to dehydration over the weekend, however his hematocrit has been elevated since Aug 2017, raising concern for other etiology.\par Patient denies symptoms of JOSEPH, and denies tobacco use or testosterone use. \par \par 7/27/22- WBC 11.4, RBC .37, Hb 18.2g/dl, Hct 56.9%, MCV 89.3, RDW 13.1, PLTs 166, neutrophils 8.73, lymphocytes 1.47, monocytes 1.12, eos 0.07, basos 0.07.\par He goes to [facility]. Labs from Nov 2021 found Hgb elevated at 17.4 and Hct 53.3. RBC elevated at 5.98.\par \par - Willl check the following: CBC with differential, CMP, iron studies, ferritin, JAK2 [id number] \par \par I had a detailed discussion today with the patient regarding the natural history, epidemiology, diagnosis, and treatment of erythrocytosis. I reviewed his laboratory studies in detail today. I then discussed the differential diagnosis of erythrocytosis including a reactive process, polycythemia vera. Complete work up was sent today.     I answered all his questions to satisfaction.\par \par RTC 3 months. \par \par \par \par RTC in 3 months

## 2022-07-28 NOTE — HISTORY OF PRESENT ILLNESS
[0 - No Distress] : Distress Level: 0 [80: Normal activity with effort; some signs or symptoms of disease.] : 80: Normal activity with effort; some signs or symptoms of disease.  [de-identified] : 71 yo gentleman with PMHx of CAD s/p 3 stents, HLD, HTN, referred for evaluation of erythrocytosis.\par \par Patient was planned for a colonoscopy, however was identified on pre-procedure labs to have erythrocytosis. Procedure was postponed.\par Patient reported that he had visited the beach on Saturday 7/23/22, thereafter developed fever and dizziness for several days, which he thinks may be due to dehydration and sun sickness.\par Patient denies taking any exogenous testosterone use. He is not aware of any snoring or sleep apnea. He reports that he is active and usually plays tennis 2-3 times weekly. \par He denies any headache, but has dizzy spells worsened with staring at screens. \par He denies night sweats, weight loss, or bone pain. He was in the [facility] ED on 7/3/22. He reports persistent cough. \par \par 7/27/22- WBC 11.4, RBC .37, Hb 18.2g/dl, Hct 56.9%, MCV 89.3, RDW 13.1, PLTs 166, neutrophils 8.73, lymphocytes 1.47, monocytes 1.12, eos 0.07, basos 0.07. \par He goes to [facility][1]. Labs from Nov 2021 found Hgb elevated at 17.4 and Hct 53.3. RBC elevated at 5.98.\par \par PMH: CAD, htn, hld\par PSH: appendectomy, hand surgery, umbilical hernia repair, wrist repair, \par Family Hx: no hx of blood disorders\par Social Hx: no tobacco use, drinks alcohol socially, is , has children and grandchildren\par Medications: Patient reports he is taking aspirin and crestor, but has not been taking entresto or eliquis, which had been prescribed by his cardiologist. \par

## 2022-07-28 NOTE — CONSULT LETTER
[Dear  ___] : Dear  [unfilled], [Consult Letter:] : I had the pleasure of evaluating your patient, [unfilled]. [Please see my note below.] : Please see my note below. [Consult Closing:] : Thank you very much for allowing me to participate in the care of this patient.  If you have any questions, please do not hesitate to contact me. [Sincerely,] : Sincerely, [FreeTextEntry2] : Dr Paul Muñiz

## 2022-07-28 NOTE — PHYSICAL EXAM
[Normal] : affect appropriate [Restricted in physically strenuous activity but ambulatory and able to carry out work of a light or sedentary nature] : Status 1- Restricted in physically strenuous activity but ambulatory and able to carry out work of a light or sedentary nature, e.g., light house work, office work [de-identified] : no palpable splenomegaly

## 2022-07-29 LAB
ALBUMIN SERPL ELPH-MCNC: 4 G/DL
ALP BLD-CCNC: 66 U/L
ALT SERPL-CCNC: 80 U/L
ANION GAP SERPL CALC-SCNC: 13 MMOL/L
AST SERPL-CCNC: 72 U/L
BILIRUB SERPL-MCNC: 0.9 MG/DL
BUN SERPL-MCNC: 20 MG/DL
CALCIUM SERPL-MCNC: 9.6 MG/DL
CHLORIDE SERPL-SCNC: 103 MMOL/L
CO2 SERPL-SCNC: 25 MMOL/L
CREAT SERPL-MCNC: 1.34 MG/DL
EGFR: 57 ML/MIN/1.73M2
FERRITIN SERPL-MCNC: 393 NG/ML
GLUCOSE SERPL-MCNC: 76 MG/DL
IRON SATN MFR SERPL: 26 %
IRON SERPL-MCNC: 69 UG/DL
POTASSIUM SERPL-SCNC: 4.4 MMOL/L
PROT SERPL-MCNC: 7.4 G/DL
SODIUM SERPL-SCNC: 141 MMOL/L
TIBC SERPL-MCNC: 262 UG/DL
UIBC SERPL-MCNC: 194 UG/DL

## 2022-08-16 LAB
EXTRACTION: NORMAL
JAK2 P.V617F BLD/T QL: NORMAL
LAB DIRECTOR NAME PROVIDER: NORMAL
LABORATORY COMMENT REPORT: NORMAL
REFLEX:: NORMAL

## 2022-08-23 DIAGNOSIS — Z11.59 ENCOUNTER FOR SCREENING FOR OTHER VIRAL DISEASES: ICD-10-CM

## 2022-09-29 ENCOUNTER — OUTPATIENT (OUTPATIENT)
Dept: OUTPATIENT SERVICES | Facility: HOSPITAL | Age: 72
LOS: 1 days | End: 2022-09-29
Payer: MEDICARE

## 2022-09-29 DIAGNOSIS — Z98.890 OTHER SPECIFIED POSTPROCEDURAL STATES: Chronic | ICD-10-CM

## 2022-09-29 DIAGNOSIS — Z90.49 ACQUIRED ABSENCE OF OTHER SPECIFIED PARTS OF DIGESTIVE TRACT: Chronic | ICD-10-CM

## 2022-09-29 DIAGNOSIS — Z20.828 CONTACT WITH AND (SUSPECTED) EXPOSURE TO OTHER VIRAL COMMUNICABLE DISEASES: ICD-10-CM

## 2022-09-29 PROCEDURE — U0003: CPT

## 2022-09-29 PROCEDURE — U0005: CPT

## 2022-09-29 PROCEDURE — 87900 PHENOTYPE INFECT AGENT DRUG: CPT

## 2022-10-03 ENCOUNTER — APPOINTMENT (OUTPATIENT)
Dept: INTERNAL MEDICINE | Facility: HOSPITAL | Age: 72
End: 2022-10-03

## 2022-10-03 ENCOUNTER — OUTPATIENT (OUTPATIENT)
Dept: OUTPATIENT SERVICES | Facility: HOSPITAL | Age: 72
LOS: 1 days | End: 2022-10-03
Payer: MEDICARE

## 2022-10-03 DIAGNOSIS — Z98.890 OTHER SPECIFIED POSTPROCEDURAL STATES: Chronic | ICD-10-CM

## 2022-10-03 DIAGNOSIS — Z90.49 ACQUIRED ABSENCE OF OTHER SPECIFIED PARTS OF DIGESTIVE TRACT: Chronic | ICD-10-CM

## 2022-10-03 DIAGNOSIS — Z12.11 ENCOUNTER FOR SCREENING FOR MALIGNANT NEOPLASM OF COLON: ICD-10-CM

## 2022-10-03 PROCEDURE — G0105: CPT

## 2022-10-03 PROCEDURE — 45378 DIAGNOSTIC COLONOSCOPY: CPT

## 2022-10-03 RX ORDER — ASPIRIN/CALCIUM CARB/MAGNESIUM 324 MG
324 TABLET ORAL ONCE
Refills: 0 | Status: COMPLETED | OUTPATIENT
Start: 2022-10-03 | End: 2022-10-03

## 2022-10-03 RX ORDER — SODIUM CHLORIDE 9 MG/ML
500 INJECTION INTRAMUSCULAR; INTRAVENOUS; SUBCUTANEOUS
Refills: 0 | Status: COMPLETED | OUTPATIENT
Start: 2022-10-03 | End: 2022-10-03

## 2022-10-03 RX ADMIN — SODIUM CHLORIDE 75 MILLILITER(S): 9 INJECTION INTRAMUSCULAR; INTRAVENOUS; SUBCUTANEOUS at 08:32

## 2022-10-03 RX ADMIN — Medication 324 MILLIGRAM(S): at 08:31

## 2022-10-11 LAB — SARS-COV-2 N GENE NPH QL NAA+PROBE: NOT DETECTED

## 2022-10-20 ENCOUNTER — APPOINTMENT (OUTPATIENT)
Dept: HEMATOLOGY ONCOLOGY | Facility: CLINIC | Age: 72
End: 2022-10-20

## 2022-10-20 ENCOUNTER — OUTPATIENT (OUTPATIENT)
Dept: OUTPATIENT SERVICES | Facility: HOSPITAL | Age: 72
LOS: 1 days | Discharge: HOME | End: 2022-10-20

## 2022-10-20 DIAGNOSIS — Z98.890 OTHER SPECIFIED POSTPROCEDURAL STATES: Chronic | ICD-10-CM

## 2022-10-20 DIAGNOSIS — R97.1 ELEVATED CANCER ANTIGEN 125 [CA 125]: ICD-10-CM

## 2022-10-20 DIAGNOSIS — Z90.49 ACQUIRED ABSENCE OF OTHER SPECIFIED PARTS OF DIGESTIVE TRACT: Chronic | ICD-10-CM

## 2022-10-27 ENCOUNTER — APPOINTMENT (OUTPATIENT)
Dept: HEMATOLOGY ONCOLOGY | Facility: CLINIC | Age: 72
End: 2022-10-27

## 2022-10-27 ENCOUNTER — RESULT REVIEW (OUTPATIENT)
Age: 72
End: 2022-10-27

## 2022-10-27 VITALS
SYSTOLIC BLOOD PRESSURE: 158 MMHG | RESPIRATION RATE: 16 BRPM | DIASTOLIC BLOOD PRESSURE: 74 MMHG | HEART RATE: 53 BPM | WEIGHT: 199.5 LBS | BODY MASS INDEX: 31.31 KG/M2 | TEMPERATURE: 97.2 F | OXYGEN SATURATION: 96 % | HEIGHT: 66.93 IN

## 2022-10-27 DIAGNOSIS — D75.1 SECONDARY POLYCYTHEMIA: ICD-10-CM

## 2022-10-27 LAB
BASOPHILS # BLD AUTO: 0.1 K/UL — SIGNIFICANT CHANGE UP (ref 0–0.2)
BASOPHILS NFR BLD AUTO: 1.5 % — SIGNIFICANT CHANGE UP (ref 0–2)
EOSINOPHIL # BLD AUTO: 0.24 K/UL — SIGNIFICANT CHANGE UP (ref 0–0.5)
EOSINOPHIL NFR BLD AUTO: 3.7 % — SIGNIFICANT CHANGE UP (ref 0–6)
HCT VFR BLD CALC: 49.5 % — SIGNIFICANT CHANGE UP (ref 39–50)
HGB BLD-MCNC: 16.4 G/DL — SIGNIFICANT CHANGE UP (ref 13–17)
IMM GRANULOCYTES NFR BLD AUTO: 0.2 % — SIGNIFICANT CHANGE UP (ref 0–0.9)
LYMPHOCYTES # BLD AUTO: 1.32 K/UL — SIGNIFICANT CHANGE UP (ref 1–3.3)
LYMPHOCYTES # BLD AUTO: 20.2 % — SIGNIFICANT CHANGE UP (ref 13–44)
MCHC RBC-ENTMCNC: 28.8 PG — SIGNIFICANT CHANGE UP (ref 27–34)
MCHC RBC-ENTMCNC: 33.1 G/DL — SIGNIFICANT CHANGE UP (ref 32–36)
MCV RBC AUTO: 87 FL — SIGNIFICANT CHANGE UP (ref 80–100)
MONOCYTES # BLD AUTO: 0.73 K/UL — SIGNIFICANT CHANGE UP (ref 0–0.9)
MONOCYTES NFR BLD AUTO: 11.2 % — SIGNIFICANT CHANGE UP (ref 2–14)
NEUTROPHILS # BLD AUTO: 4.14 K/UL — SIGNIFICANT CHANGE UP (ref 1.8–7.4)
NEUTROPHILS NFR BLD AUTO: 63.2 % — SIGNIFICANT CHANGE UP (ref 43–77)
NRBC # BLD: 0 /100 WBCS — SIGNIFICANT CHANGE UP (ref 0–0)
PLATELET # BLD AUTO: 179 K/UL — SIGNIFICANT CHANGE UP (ref 150–400)
RBC # BLD: 5.69 M/UL — SIGNIFICANT CHANGE UP (ref 4.2–5.8)
RBC # FLD: 12.9 % — SIGNIFICANT CHANGE UP (ref 10.3–14.5)
WBC # BLD: 6.54 K/UL — SIGNIFICANT CHANGE UP (ref 3.8–10.5)
WBC # FLD AUTO: 6.54 K/UL — SIGNIFICANT CHANGE UP (ref 3.8–10.5)

## 2022-10-27 PROCEDURE — 99214 OFFICE O/P EST MOD 30 MIN: CPT

## 2022-10-28 LAB
ALBUMIN SERPL ELPH-MCNC: 4 G/DL
ALP BLD-CCNC: 60 U/L
ALT SERPL-CCNC: 30 U/L
ANION GAP SERPL CALC-SCNC: 13 MMOL/L
AST SERPL-CCNC: 33 U/L
BILIRUB SERPL-MCNC: 0.9 MG/DL
BUN SERPL-MCNC: 20 MG/DL
CALCIUM SERPL-MCNC: 9.6 MG/DL
CHLORIDE SERPL-SCNC: 102 MMOL/L
CO2 SERPL-SCNC: 26 MMOL/L
CREAT SERPL-MCNC: 1.16 MG/DL
EGFR: 67 ML/MIN/1.73M2
GLUCOSE SERPL-MCNC: 85 MG/DL
LDH SERPL-CCNC: 187 U/L
POTASSIUM SERPL-SCNC: 4.9 MMOL/L
PROT SERPL-MCNC: 7.3 G/DL
SODIUM SERPL-SCNC: 141 MMOL/L

## 2022-10-31 LAB — GENE XXX MUT ANL BLD/T: NORMAL

## 2022-11-01 LAB — TM INTERPRETATION: NORMAL

## 2022-11-01 NOTE — ASSESSMENT
[FreeTextEntry1] : 71 yo gentleman with PMHx of CAD s/p 3 stents, HLD, HTN, referred for evaluation of erythrocytosis.\par \par #Erythrocytosis: \par hematocrit has been elevated since Aug 2017,  ERIKA 2 v617 f negative. \par Patient denies symptoms of JOSEPH, and denies tobacco use or testosterone use. \par \par 10/27/22- WBC 6.54, RBC 5.69, Hb 16.4g/dl, Hct 49.5%, MCV 87, RDW 12.9, PLTs 179, neutrophils 63.2, lymphocytes 20.2, monocytes 11.2, eos 3.7, basos 1.5.\par \par We checked additional studies including MPL,CALR,  and hemochromatosis gene to evaluate etiology of erythrocytosis; will call patient with results.\par \par

## 2022-11-01 NOTE — PHYSICAL EXAM
[Restricted in physically strenuous activity but ambulatory and able to carry out work of a light or sedentary nature] : Status 1- Restricted in physically strenuous activity but ambulatory and able to carry out work of a light or sedentary nature, e.g., light house work, office work [Normal] : affect appropriate [de-identified] : no palpable splenomegaly

## 2022-11-01 NOTE — HISTORY OF PRESENT ILLNESS
[0 - No Distress] : Distress Level: 0 [90: Able to carry normal activity; minor signs or symptoms of disease.] : 90: Able to carry normal activity; minor signs or symptoms of disease.  [de-identified] : 71 yo gentleman with PMHx of CAD s/p 3 stents, HLD, HTN, referred for evaluation of erythrocytosis.\par \par Patient was planned for a colonoscopy, however was identified on pre-procedure labs to have erythrocytosis. Procedure was postponed.\par Patient reported that he had visited the beach on Saturday 7/23/22, thereafter developed fever and dizziness for several days, which he thinks may be due to dehydration and sun sickness.\par Patient denies taking any exogenous testosterone use. He is not aware of any snoring or sleep apnea. He reports that he is active and usually plays tennis 2-3 times weekly. \par He denies any headache, but has dizzy spells worsened with staring at screens. \par He denies night sweats, weight loss, or bone pain. He was in the [facility] ED on 7/3/22. He reports persistent cough. \par \par 7/27/22- WBC 11.4, RBC .37, Hb 18.2g/dl, Hct 56.9%, MCV 89.3, RDW 13.1, PLTs 166, neutrophils 8.73, lymphocytes 1.47, monocytes 1.12, eos 0.07, basos 0.07. \par He goes to [facility]. Labs from Nov 2021 found Hgb elevated at 17.4 and Hct 53.3. RBC elevated at 5.98.\par \par PMH: CAD, htn, hld\par PSH: appendectomy, hand surgery, umbilical hernia repair, wrist repair, \par Family Hx: no hx of blood disorders\par Social Hx: no tobacco use, drinks alcohol socially, is , has children and grandchildren\par Medications: Patient reports he is taking aspirin and crestor, but has not been taking entresto or eliquis, which had been prescribed by his cardiologist. \par  [de-identified] : Patient is feeling well, denies headaches, dizziness, blurred vision, early satiety, fevers, night sweats or weight loss. Denies muscle or bone pain. \par \par No other changes in medical, surgical or social history since 7/28/22. \par

## 2022-11-03 LAB — MPL EXON 10 MUTATION: NORMAL

## 2023-07-23 ENCOUNTER — NON-APPOINTMENT (OUTPATIENT)
Age: 73
End: 2023-07-23

## 2023-07-29 ENCOUNTER — NON-APPOINTMENT (OUTPATIENT)
Age: 73
End: 2023-07-29

## 2023-08-01 ENCOUNTER — APPOINTMENT (OUTPATIENT)
Dept: RADIOLOGY | Facility: CLINIC | Age: 73
End: 2023-08-01
Payer: MEDICARE

## 2023-08-01 ENCOUNTER — OUTPATIENT (OUTPATIENT)
Dept: OUTPATIENT SERVICES | Facility: HOSPITAL | Age: 73
LOS: 1 days | End: 2023-08-01
Payer: MEDICARE

## 2023-08-01 DIAGNOSIS — Z90.49 ACQUIRED ABSENCE OF OTHER SPECIFIED PARTS OF DIGESTIVE TRACT: Chronic | ICD-10-CM

## 2023-08-01 DIAGNOSIS — Z98.890 OTHER SPECIFIED POSTPROCEDURAL STATES: Chronic | ICD-10-CM

## 2023-08-01 DIAGNOSIS — Z00.00 ENCOUNTER FOR GENERAL ADULT MEDICAL EXAMINATION WITHOUT ABNORMAL FINDINGS: ICD-10-CM

## 2023-08-01 PROCEDURE — 71046 X-RAY EXAM CHEST 2 VIEWS: CPT

## 2023-08-01 PROCEDURE — 71046 X-RAY EXAM CHEST 2 VIEWS: CPT | Mod: 26

## 2023-09-21 ENCOUNTER — NON-APPOINTMENT (OUTPATIENT)
Age: 73
End: 2023-09-21

## 2023-12-14 ENCOUNTER — NON-APPOINTMENT (OUTPATIENT)
Age: 73
End: 2023-12-14

## 2024-02-08 NOTE — REVIEW OF SYSTEMS
[FreeTextEntry7] : inc: c/d/i [Nocturia] : nocturia [Negative] : Heme/Lymph [Fever] : no fever [Chills] : no chills [Fatigue] : no fatigue [Hot Flashes] : no hot flashes [Night Sweats] : no night sweats [Recent Change In Weight] : ~T no recent weight change [Discharge] : no discharge [Pain] : no pain [Redness] : no redness [Dryness] : no dryness [Vision Problems] : no vision problems [Itching] : no itching [Earache] : no earache [Hearing Loss] : no hearing loss [Nosebleeds] : no nosebleeds [Postnasal Drip] : no postnasal drip [Nasal Discharge] : no nasal discharge [Sore Throat] : no sore throat [Hoarseness] : no hoarseness [Chest Pain] : no chest pain [Palpitations] : no palpitations [Claudication] : no  leg claudication [Lower Ext Edema] : no lower extremity edema [Orthopena] : no orthopnea [Paroxysmal Nocturnal Dyspnea] : no paroxysmal nocturnal dyspnea [Shortness Of Breath] : no shortness of breath [Wheezing] : no wheezing [Cough] : no cough [Dyspnea on Exertion] : not dyspnea on exertion [Abdominal Pain] : no abdominal pain [Nausea] : no nausea [Constipation] : no constipation [Diarrhea] : no diarrhea [Vomiting] : no vomiting [Heartburn] : no heartburn [Melena] : no melena [Dysuria] : no dysuria [Incontinence] : no incontinence [Hesitancy] : no hesitancy [Hematuria] : no hematuria [Frequency] : no frequency [Impotence] : no impotency [Poor Libido] : libido not poor [Joint Pain] : no joint pain [Joint Stiffness] : no joint stiffness [Muscle Pain] : no muscle pain [Muscle Weakness] : no muscle weakness [Back Pain] : no back pain [Joint Swelling] : no joint swelling [Mole Changes] : no mole changes [Nail Changes] : no nail changes [Hair Changes] : no hair changes [Skin Rash] : no skin rash [Headache] : no headache [Dizziness] : no dizziness [Fainting] : no fainting [Confusion] : no confusion [Unsteady Walk] : no ataxia [Memory Loss] : no memory loss [Suicidal] : not suicidal [Insomnia] : no insomnia [Anxiety] : no anxiety [Depression] : no depression [Easy Bleeding] : no easy bleeding [Easy Bruising] : no easy bruising [Swollen Glands] : no swollen glands [FreeTextEntry9] : l

## 2024-03-06 NOTE — REVIEW OF SYSTEMS
CC:  Jemal Polanco is here today for Ear Problem (Left ear/ pressure/ fluid/ sx 2 days)    Medications: medications verified, no change  Added preferred pharmacy  Refills needed today?  NO  denies Latex allergy or sensitivity    Patient would like communication of their results via:  Send letter with results.  If need to speak with pt, call   Cell Phone:   Telephone Information:   Mobile [phone]     Okay to leave a message containing results? Yes  Health Maintenance Due   Topic Date Due    Influenza Vaccine (1) 09/01/2023    COVID-19 Vaccine (3 - 2023-24 season) 09/01/2023     Patient is up to date, no discussion needed..          COVID-19 Screening:    Does the patient OR patient’s household members have any of the following symptoms?  Temperature: Fever ?100.0°F or ?37.8°C?  No  Respiratory symptoms: New or worsening cough, shortness of breath, difficulty breathing, or sore throat? No  GI symptoms: New onset of nausea, vomiting or diarrhea?  No  Miscellaneous: New onset of loss of taste or smell, chills, repeated shaking with chills, muscle pain, headache, congestion or runny nose?  No  Has the patient or a household member tested positive for COVID-19 in the last 14 days?  No  Has the patient or a household member been tested for COVID-19 and are waiting for the results?  No               [see HPI] : see HPI [Negative] : Heme/Lymph

## 2024-09-20 ENCOUNTER — NON-APPOINTMENT (OUTPATIENT)
Age: 74
End: 2024-09-20
